# Patient Record
Sex: MALE | Race: WHITE | NOT HISPANIC OR LATINO | Employment: FULL TIME | URBAN - METROPOLITAN AREA
[De-identification: names, ages, dates, MRNs, and addresses within clinical notes are randomized per-mention and may not be internally consistent; named-entity substitution may affect disease eponyms.]

---

## 2017-02-23 ENCOUNTER — OFFICE VISIT (OUTPATIENT)
Dept: INTERNAL MEDICINE | Facility: CLINIC | Age: 32
End: 2017-02-23

## 2017-02-23 VITALS
BODY MASS INDEX: 39.62 KG/M2 | HEART RATE: 84 BPM | WEIGHT: 283 LBS | OXYGEN SATURATION: 98 % | HEIGHT: 71 IN | TEMPERATURE: 98.8 F | DIASTOLIC BLOOD PRESSURE: 84 MMHG | SYSTOLIC BLOOD PRESSURE: 138 MMHG

## 2017-02-23 DIAGNOSIS — J20.9 ACUTE BRONCHITIS, UNSPECIFIED ORGANISM: Primary | ICD-10-CM

## 2017-02-23 DIAGNOSIS — Z72.51 HIGH RISK SEXUAL BEHAVIOR: ICD-10-CM

## 2017-02-23 LAB
HCV AB SER DONR QL: NORMAL
HIV1+2 AB SER QL: NORMAL

## 2017-02-23 PROCEDURE — 86803 HEPATITIS C AB TEST: CPT | Performed by: NURSE PRACTITIONER

## 2017-02-23 PROCEDURE — 87491 CHLMYD TRACH DNA AMP PROBE: CPT | Performed by: NURSE PRACTITIONER

## 2017-02-23 PROCEDURE — 87591 N.GONORRHOEAE DNA AMP PROB: CPT | Performed by: NURSE PRACTITIONER

## 2017-02-23 PROCEDURE — 99213 OFFICE O/P EST LOW 20 MIN: CPT | Performed by: NURSE PRACTITIONER

## 2017-02-23 PROCEDURE — G0432 EIA HIV-1/HIV-2 SCREEN: HCPCS | Performed by: NURSE PRACTITIONER

## 2017-02-23 RX ORDER — IBUPROFEN 200 MG
200 TABLET ORAL EVERY 6 HOURS PRN
COMMUNITY

## 2017-02-23 RX ORDER — CEFDINIR 300 MG/1
300 CAPSULE ORAL 2 TIMES DAILY
Qty: 20 CAPSULE | Refills: 0 | Status: SHIPPED | OUTPATIENT
Start: 2017-02-23 | End: 2017-03-28

## 2017-02-23 NOTE — PROGRESS NOTES
"Subjective  Cough (ongoing for over 1 week, scratchy throat ); Fatigue; and Nasal Congestion      Enrique Larry is a 31 y.o. male.   Allergies   Allergen Reactions   • Azithromycin    • Penicillins    • Sulfa Antibiotics      History of Present Illness      Scratchy throat that has went away, thinks he may have had a fever, for past 3 days  Has been having prod cough , hoarse voice, \" I feel crummy\", has tried claritin and zicam with minimal relief , has had ill contacts   Non cigarette smoker   Wants to be checked for std's has had unprotected intercourse , no sx but would like to be checked    The following portions of the patient's history were reviewed and updated as appropriate: allergies, current medications, past family history, past medical history, past social history, past surgical history and problem list.    Review of Systems   Constitutional: Positive for fatigue and fever.   HENT: Positive for congestion, sneezing, sore throat and trouble swallowing.    Respiratory: Positive for cough.    Genitourinary:        Unprotected intercourse   Psychiatric/Behavioral: Negative.    All other systems reviewed and are negative.      Objective   Physical Exam   Constitutional: He is oriented to person, place, and time. He appears well-developed and well-nourished.   HENT:   Head: Normocephalic and atraumatic.   Right Ear: External ear normal.   Mouth/Throat: Oropharynx is clear and moist.   Neck: No thyromegaly present.   Cardiovascular: Normal rate and regular rhythm.    Pulmonary/Chest: He has rhonchi in the right lower field and the left lower field.   Genitourinary: Penis normal.   Lymphadenopathy:     He has no cervical adenopathy.   Neurological: He is alert and oriented to person, place, and time.   Skin: Skin is warm and dry.   Psychiatric: He has a normal mood and affect. His behavior is normal. Judgment and thought content normal.   Nursing note and vitals reviewed.    Visit Vitals   • /84   • " "Pulse 84   • Temp 98.8 °F (37.1 °C)   • Ht 71\" (180.3 cm)   • Wt 283 lb (128 kg)   • SpO2 98%   • BMI 39.47 kg/m2       Assessment/Plan     Problem List Items Addressed This Visit     None      Visit Diagnoses     High risk sexual behavior    -  Primary    Relevant Orders    C.trachomatis/N. gonorrhoeae PCR Urine    HIV-1/O/2 Ag/Ab w Reflex    Hepatitis C antibody    Acute bronchitis, unspecified organism        Relevant Medications    cefdinir (OMNICEF) 300 MG capsule          I have noted the pcn allergy ( bumps he got as a child), we have talked about the small percentage of cross reaction, he will call me with any needs   Std check will send to pt with poc when completed     "

## 2017-02-24 ENCOUNTER — TELEPHONE (OUTPATIENT)
Dept: INTERNAL MEDICINE | Facility: CLINIC | Age: 32
End: 2017-02-24

## 2017-02-24 NOTE — TELEPHONE ENCOUNTER
Called and informed patient that his lab results came back negative. Patient verbalized understanding and had no further questions.

## 2017-02-28 ENCOUNTER — TELEPHONE (OUTPATIENT)
Dept: INTERNAL MEDICINE | Facility: CLINIC | Age: 32
End: 2017-02-28

## 2017-02-28 LAB
C TRACH RRNA SPEC DONR QL NAA+PROBE: NEGATIVE
N GONORRHOEA DNA SPEC QL NAA+PROBE: NEGATIVE

## 2017-02-28 NOTE — TELEPHONE ENCOUNTER
Informed pt that his urine culture came back negative. Patient verbalized understanding and had no further questions.

## 2017-03-27 ENCOUNTER — TELEPHONE (OUTPATIENT)
Dept: INTERNAL MEDICINE | Facility: CLINIC | Age: 32
End: 2017-03-27

## 2017-03-27 NOTE — TELEPHONE ENCOUNTER
----- Message from Renata Quesada DO sent at 3/27/2017  2:25 PM EDT -----  Contact: PATIENT  Needs to be seen  ----- Message -----     From: Noni Parham MA     Sent: 3/27/2017   2:11 PM       To: Renata Quesada DO    Please advise.   ----- Message -----     From: Twila Denis     Sent: 3/27/2017   9:58 AM       To: Alyssa Moses MA    PATIENT WENT TO URGENT CARE ON SATURDAY FOR BRONCHITIS ISSUES. THEY GAVE HIM A STEROID INJECTION AND ANTIBIOTIC. HE HAS HAD 5 DOSES OF THE ANTIBIOTIC.  HE IS NOW RUNNING A FEVER AND STILL DOES NOT FEEL GOOD. HE WASN'T SURE IF WE NEED TO SEE HIM OR DOES HE NEED TO RIDE IT OUT SINCE HE JUST GOT STEROID INJECTION AND ANTIBIOTIC. YOU CAN REACH HIM BACK [phone]

## 2017-03-27 NOTE — TELEPHONE ENCOUNTER
Spoke with pt and informed him of Dr. Quesada recommendations to be seen. Pt verbalized understanding and was scheduled with Nelli on 3/28/17 at 8am. Pt had no further questions.

## 2017-03-28 ENCOUNTER — OFFICE VISIT (OUTPATIENT)
Dept: INTERNAL MEDICINE | Facility: CLINIC | Age: 32
End: 2017-03-28

## 2017-03-28 VITALS
HEART RATE: 92 BPM | BODY MASS INDEX: 38.64 KG/M2 | OXYGEN SATURATION: 98 % | DIASTOLIC BLOOD PRESSURE: 80 MMHG | HEIGHT: 71 IN | SYSTOLIC BLOOD PRESSURE: 130 MMHG | WEIGHT: 276 LBS | TEMPERATURE: 98.4 F

## 2017-03-28 DIAGNOSIS — J10.1 INFLUENZA A: Primary | ICD-10-CM

## 2017-03-28 LAB
EXPIRATION DATE: NORMAL
FLUAV AG NPH QL: POSITIVE
FLUBV AG NPH QL: NEGATIVE
INTERNAL CONTROL: NORMAL
Lab: NORMAL

## 2017-03-28 PROCEDURE — 99213 OFFICE O/P EST LOW 20 MIN: CPT | Performed by: PHYSICIAN ASSISTANT

## 2017-03-28 PROCEDURE — 87804 INFLUENZA ASSAY W/OPTIC: CPT | Performed by: PHYSICIAN ASSISTANT

## 2017-03-28 RX ORDER — METHYLPREDNISOLONE 4 MG/1
TABLET ORAL
Qty: 21 TABLET | Refills: 0 | Status: SHIPPED | OUTPATIENT
Start: 2017-03-28 | End: 2017-08-22

## 2017-03-28 RX ORDER — OSELTAMIVIR PHOSPHATE 75 MG/1
75 CAPSULE ORAL 2 TIMES DAILY
Qty: 10 CAPSULE | Refills: 0 | Status: SHIPPED | OUTPATIENT
Start: 2017-03-28 | End: 2017-08-22

## 2017-03-28 NOTE — PROGRESS NOTES
Chief Complaint   Patient presents with   • Fever, cough/mucus, nasal congestion     Cough x1 month, fever, chills x4 days       Subjective   Enrique Larry is a 31 y.o. male.       History of Present Illness     Pt had bronchitis a month ago. Was treated with abx, never completely cleared the cough. Symptoms worsened over the past weekend. Went to urgent care the next day, had fever that day. Got much worse with fever and cough 2 days ago. No flu swab at urgent care. Unsure about flu exposure, did have flu shot. Was given doxycycline and had 4 days ago. He is wheezing and feels tight in chest. Some increased nasal congestion and headache with most recent illness.      Current Outpatient Prescriptions:   •  doxycycline (MONODOX) 100 MG capsule, Take 1 capsule by mouth 2 (Two) Times a Day., Disp: 20 capsule, Rfl: 0  •  ibuprofen (ADVIL,MOTRIN) 200 MG tablet, Take 200 mg by mouth Every 6 (Six) Hours As Needed for mild pain (1-3)., Disp: , Rfl:   •  MethylPREDNISolone (MEDROL) 4 MG tablet, follow package directions, Disp: 21 tablet, Rfl: 0  •  oseltamivir (TAMIFLU) 75 MG capsule, Take 1 capsule by mouth 2 (Two) Times a Day., Disp: 10 capsule, Rfl: 0     [facility]  The following portions of the patient's history were reviewed and updated as appropriate: allergies, current medications, past family history, past medical history, past social history, past surgical history and problem list.    Review of Systems   Constitutional: Positive for activity change, appetite change, diaphoresis, fatigue and fever.   HENT: Positive for congestion, postnasal drip and rhinorrhea.    Respiratory: Positive for cough, chest tightness and wheezing. Negative for shortness of breath.    Cardiovascular: Negative for chest pain and palpitations.   Gastrointestinal: Negative for abdominal distention, diarrhea and nausea.   Genitourinary: Negative.    Musculoskeletal: Positive for myalgias. Negative for arthralgias.   Neurological: Negative for  "dizziness, weakness and light-headedness.   Hematological: Negative for adenopathy.   Psychiatric/Behavioral: Negative.        Objective   /80  Pulse 92  Temp 98.4 °F (36.9 °C)  Ht 71\" (180.3 cm)  Wt 276 lb (125 kg)  SpO2 98%  BMI 38.49 kg/m2    Physical Exam   Constitutional: He is oriented to person, place, and time. He appears well-developed and well-nourished.  Non-toxic appearance. No distress.   HENT:   Head: Normocephalic and atraumatic. Hair is normal.   Right Ear: External ear normal. No drainage, swelling or tenderness. Tympanic membrane is retracted.   Left Ear: External ear normal. No drainage, swelling or tenderness. Tympanic membrane is retracted.   Nose: Mucosal edema present. No epistaxis.   Mouth/Throat: Uvula is midline and mucous membranes are normal. No oral lesions. No uvula swelling. Posterior oropharyngeal erythema present. No oropharyngeal exudate.   Eyes: Conjunctivae and EOM are normal. Pupils are equal, round, and reactive to light. Right eye exhibits no discharge. Left eye exhibits no discharge. No scleral icterus.   Neck: Normal range of motion. Neck supple.   Cardiovascular: Normal rate, regular rhythm and normal heart sounds.  Exam reveals no gallop.    No murmur heard.  Pulmonary/Chest: Breath sounds normal. No stridor. No respiratory distress. He has no wheezes. He has no rales. He exhibits no tenderness.   Abdominal: Soft. There is no tenderness.   Lymphadenopathy:     He has cervical adenopathy.   Neurological: He is alert and oriented to person, place, and time. He exhibits normal muscle tone.   Skin: Skin is warm and dry. No rash noted. He is not diaphoretic.   Psychiatric: He has a normal mood and affect. His behavior is normal. Judgment and thought content normal.   Nursing note and vitals reviewed.      Results for orders placed or performed in visit on 03/28/17   POCT Influenza A/B   Result Value Ref Range    Rapid Influenza A Ag positive     Rapid Influenza B Ag " negative     Internal Control Passed Passed    Lot Number [id number]     Expiration Date 12/06/2019         ASSESSMENT/PLAN    Problem List Items Addressed This Visit     None      Visit Diagnoses     Influenza A    -  Primary    Take tamiflu and medrol dose pack as directed. Continue rest and increased fluids. RTC if not improving. Work note provided.    Relevant Medications    oseltamivir (TAMIFLU) 75 MG capsule    MethylPREDNISolone (MEDROL) 4 MG tablet    Other Relevant Orders    POCT Influenza A/B (Completed)               Return if symptoms worsen or fail to improve.

## 2017-08-22 ENCOUNTER — OFFICE VISIT (OUTPATIENT)
Dept: INTERNAL MEDICINE | Facility: CLINIC | Age: 32
End: 2017-08-22

## 2017-08-22 VITALS — BODY MASS INDEX: 39.47 KG/M2 | WEIGHT: 283 LBS

## 2017-08-22 DIAGNOSIS — L60.0 INGROWN NAIL OF GREAT TOE OF RIGHT FOOT: Primary | ICD-10-CM

## 2017-08-22 PROCEDURE — 99213 OFFICE O/P EST LOW 20 MIN: CPT | Performed by: NURSE PRACTITIONER

## 2017-08-22 NOTE — PROGRESS NOTES
Chief Complaint   Patient presents with   • Ingrown Toenail       HPI  Enrique Larry is a 32 y.o. male who presents with ingrowing right great toenail, tried cutting straight across, had a little drainage a couple days ago, painful laterally. Wears steal toe shoe for work, on feet all day.    [facility]  The following portions of the patient's history were reviewed and updated as appropriate: allergies, current medications, past family history, past medical history, past social history, past surgical history and problem list.    Past Medical History:   Diagnosis Date   • Cancer      Past Surgical History:   Procedure Laterality Date   • ABOVE KNEE LEG AMPUTATION       There are no active problems to display for this patient.    Social History   Substance Use Topics   • Smoking status: Never Smoker   • Smokeless tobacco: Current User     Types: Snuff   • Alcohol use Yes         Review of Systems   Constitutional: Negative for fever.   Skin:        Ingrowing toenail right great toe.           Objective   Weight 283 lb (128 kg).  Body mass index is 39.47 kg/(m^2).      Physical Exam   Constitutional: He appears well-developed and well-nourished. He does not appear ill.   Skin:   Right great toenail ingrowing laterally, surrounding tenderness and slight erythema, no drainage.   Psychiatric: He has a normal mood and affect.             ASSESSMENT/PLAN  Assessment/Plan         1. Ingrown nail of great toe of right foot  Soak 2-3 times daily in warm water with epsom salts, followed by gentle traction with dental floss. Then apply:  - mupirocin (BACTROBAN) 2 % ointment; Apply  topically 3 (Three) Times a Day.  Dispense: 22 g; Refill: 0  If fever, purulent drainage, semaj or RTC.    Referred to podiatry.            FOLLOW-UP      Plan of care reviewed with patient at the conclusion of today's visit. Education was provided in regards to diagnosis, symptom management and any prescribed or recommended OTC medications.  Patient  verbalizes Understanding of and agreement with management plan.    Electronically signed by:  CAMILLE Hunt  08/22/2017

## 2018-09-17 ENCOUNTER — OFFICE VISIT (OUTPATIENT)
Dept: INTERNAL MEDICINE | Facility: CLINIC | Age: 33
End: 2018-09-17

## 2018-09-17 VITALS
WEIGHT: 282 LBS | OXYGEN SATURATION: 96 % | HEART RATE: 108 BPM | HEIGHT: 71 IN | TEMPERATURE: 99.2 F | BODY MASS INDEX: 39.48 KG/M2

## 2018-09-17 DIAGNOSIS — R05.9 COUGH: ICD-10-CM

## 2018-09-17 DIAGNOSIS — R68.89 FLU-LIKE SYMPTOMS: ICD-10-CM

## 2018-09-17 DIAGNOSIS — J01.00 ACUTE NON-RECURRENT MAXILLARY SINUSITIS: Primary | ICD-10-CM

## 2018-09-17 LAB
EXPIRATION DATE: NORMAL
FLUAV AG NPH QL: NORMAL
FLUBV AG NPH QL: NORMAL
INTERNAL CONTROL: NORMAL
Lab: NORMAL

## 2018-09-17 PROCEDURE — 99213 OFFICE O/P EST LOW 20 MIN: CPT | Performed by: NURSE PRACTITIONER

## 2018-09-17 PROCEDURE — 87804 INFLUENZA ASSAY W/OPTIC: CPT | Performed by: NURSE PRACTITIONER

## 2018-09-17 RX ORDER — BENZONATATE 100 MG/1
100 CAPSULE ORAL 3 TIMES DAILY PRN
Qty: 21 CAPSULE | Refills: 0 | Status: SHIPPED | OUTPATIENT
Start: 2018-09-17 | End: 2019-02-16 | Stop reason: SDUPTHER

## 2018-09-17 RX ORDER — DOXYCYCLINE HYCLATE 100 MG/1
100 CAPSULE ORAL 2 TIMES DAILY
Qty: 14 CAPSULE | Refills: 0 | Status: SHIPPED | OUTPATIENT
Start: 2018-09-17 | End: 2018-09-24

## 2018-09-17 NOTE — PROGRESS NOTES
Chief Complaint   Patient presents with   • Nasal Congestion   • Dizziness   • Headache       History of Present Illness  33 y.o.male presents for URI sx, dizziness, headaches.    Onset sx Friday nasal congestion, rhinorrhea, sneezing, coughing.  Headaches with dizziness.  Some occasional muscle aches pains. Tried sudafed, Nyquil, zycam.        Review of Systems   Constitutional: Positive for fever. Negative for chills.   HENT: Positive for congestion, postnasal drip, rhinorrhea, sinus pressure, sneezing and sore throat. Negative for ear pain.    Respiratory: Positive for cough. Negative for shortness of breath.    Cardiovascular: Negative for chest pain.   Gastrointestinal: Negative for diarrhea, nausea and vomiting.   Musculoskeletal: Positive for myalgias.   Neurological: Positive for dizziness and headache.         [facility]  The following portions of the patient's history were reviewed and updated as appropriate: allergies, current medications, past family history, past medical history, past social history, past surgical history and problem list.     Past Medical History:   Diagnosis Date   • Cancer (CMS/HCC)       Past Surgical History:   Procedure Laterality Date   • ABOVE KNEE LEG AMPUTATION        Allergies   Allergen Reactions   • Azithromycin    • Penicillins    • Sulfa Antibiotics       Family History   Problem Relation Age of Onset   • Cancer Mother    • Hypertension Mother    • Thyroid disease Mother    • Cancer Maternal Aunt    • Cancer Maternal Uncle    • Cancer Paternal Aunt    • Cancer Paternal Uncle    • Cancer Maternal Grandmother       Social History     Social History   • Marital status: Single     Spouse name: N/A   • Number of children: N/A   • Years of education: N/A     Occupational History   • Not on file.     Social History Main Topics   • Smoking status: Never Smoker   • Smokeless tobacco: Current User     Types: Snuff   • Alcohol use Yes   • Drug use: No   • Sexual activity: Defer     Other  "Topics Concern   • Not on file     Social History Narrative   • No narrative on file         Current Outpatient Prescriptions:   •  ibuprofen (ADVIL,MOTRIN) 200 MG tablet, Take 200 mg by mouth Every 6 (Six) Hours As Needed for mild pain (1-3)., Disp: , Rfl:   •  mupirocin (BACTROBAN) 2 % ointment, Apply  topically 3 (Three) Times a Day., Disp: 22 g, Rfl: 0    VITALS:  Pulse 108   Temp 99.2 °F (37.3 °C)   Ht 180.3 cm (71\")   Wt 128 kg (282 lb)   SpO2 96%   BMI 39.33 kg/m²     Physical Exam   Constitutional: He is oriented to person, place, and time. He appears well-developed and well-nourished. No distress.   HENT:   Head: Normocephalic and atraumatic.   Right Ear: Tympanic membrane, external ear and ear canal normal.   Left Ear: Tympanic membrane, external ear and ear canal normal.   Nose: Mucosal edema, rhinorrhea, sinus tenderness and congestion present. Right sinus exhibits maxillary sinus tenderness. Right sinus exhibits no frontal sinus tenderness. Left sinus exhibits maxillary sinus tenderness. Left sinus exhibits no frontal sinus tenderness.   Mouth/Throat: Mucous membranes are normal. Posterior oropharyngeal erythema present. No oropharyngeal exudate, posterior oropharyngeal edema or tonsillar abscesses.   karen ear cerumen   Eyes: Pupils are equal, round, and reactive to light. Conjunctivae are normal. Right eye exhibits no discharge. Left eye exhibits no discharge.   Neck: Neck supple.   Cardiovascular: Normal rate, regular rhythm and normal heart sounds.    Pulmonary/Chest: Effort normal and breath sounds normal. No respiratory distress. He has no wheezes.   Abdominal: Soft. Bowel sounds are normal.   Lymphadenopathy:        Head (right side): No submental, no submandibular, no tonsillar and no preauricular adenopathy present.        Head (left side): No submental, no submandibular, no tonsillar and no preauricular adenopathy present.     He has no cervical adenopathy.   Neurological: He is alert and " oriented to person, place, and time.   Skin: Skin is warm and dry. Capillary refill takes less than 2 seconds. He is not diaphoretic.   Nursing note and vitals reviewed.      LABS  Results for orders placed or performed in visit on 09/17/18   POCT Influenza A/B   Result Value Ref Range    Rapid Influenza A Ag neg     Rapid Influenza B Ag neg     Internal Control Passed Passed    Lot Number [id number]     Expiration Date 1/28/20        ASSESSMENT/PLAN  Enrique was seen today for nasal congestion, dizziness and headache.    Diagnoses and all orders for this visit:    Acute non-recurrent maxillary sinusitis  Comments:  add daily antihistamine; drink plenty of water; can cont sudafed  Orders:  -     doxycycline (VIBRAMYCIN) 100 MG capsule; Take 1 capsule by mouth 2 (Two) Times a Day for 7 days.    Cough  -     benzonatate (TESSALON PERLES) 100 MG capsule; Take 1 capsule by mouth 3 (Three) Times a Day As Needed for Cough.    Flu-like symptoms  -     POCT Influenza A/B    If worsening symptoms or fever > 101.5 recommend he contact us for further eval.    I discussed the patients findings and my recommendations with patient.     Patient was encouraged to keep me informed of any acute changes, lack of improvement, or any new concerning symptoms.    Patient voiced understanding of all instructions and denied further questions.      FOLLOW-UP  Return if symptoms worsen or fail to improve.    Electronically signed by:    CAMILLE Sykes  09/17/2018

## 2019-02-16 ENCOUNTER — OFFICE VISIT (OUTPATIENT)
Dept: INTERNAL MEDICINE | Facility: CLINIC | Age: 34
End: 2019-02-16

## 2019-02-16 VITALS
WEIGHT: 294 LBS | HEART RATE: 97 BPM | OXYGEN SATURATION: 97 % | DIASTOLIC BLOOD PRESSURE: 76 MMHG | HEIGHT: 71 IN | SYSTOLIC BLOOD PRESSURE: 114 MMHG | TEMPERATURE: 98.4 F | BODY MASS INDEX: 41.16 KG/M2

## 2019-02-16 DIAGNOSIS — J06.9 VIRAL URI WITH COUGH: Primary | ICD-10-CM

## 2019-02-16 DIAGNOSIS — R05.9 COUGH: ICD-10-CM

## 2019-02-16 LAB
EXPIRATION DATE: NORMAL
FLUAV AG NPH QL: NEGATIVE
FLUBV AG NPH QL: NEGATIVE
INTERNAL CONTROL: NORMAL
Lab: NORMAL

## 2019-02-16 PROCEDURE — 87804 INFLUENZA ASSAY W/OPTIC: CPT | Performed by: NURSE PRACTITIONER

## 2019-02-16 PROCEDURE — 99214 OFFICE O/P EST MOD 30 MIN: CPT | Performed by: NURSE PRACTITIONER

## 2019-02-16 RX ORDER — BENZONATATE 100 MG/1
100 CAPSULE ORAL 3 TIMES DAILY PRN
Qty: 21 CAPSULE | Refills: 0 | Status: SHIPPED | OUTPATIENT
Start: 2019-02-16 | End: 2019-08-12

## 2019-02-16 NOTE — PROGRESS NOTES
Maty Larry is a 33 y.o. male.     URI    This is a new problem. The current episode started yesterday. The problem has been unchanged. There has been no fever. Associated symptoms include congestion, coughing, rhinorrhea and sneezing. Pertinent negatives include no chest pain, ear pain, headaches, nausea, neck pain, rash, sinus pain, sore throat or swollen glands. He has tried antihistamine and increased fluids for the symptoms. The treatment provided mild relief.           Review of Systems   Constitutional: Negative for chills and fever.   HENT: Positive for congestion, rhinorrhea and sneezing. Negative for ear pain, sinus pressure, sinus pain and sore throat.    Respiratory: Positive for cough. Negative for chest tightness.    Cardiovascular: Negative for chest pain.   Gastrointestinal: Negative for nausea.   Musculoskeletal: Negative for neck pain.   Skin: Negative for rash.   Allergic/Immunologic: Negative for environmental allergies and immunocompromised state.   Neurological: Negative for headaches.   Hematological: Negative for adenopathy.       Objective   Physical Exam   Constitutional: He is oriented to person, place, and time. He appears well-developed and well-nourished. No distress.   HENT:   Head: Normocephalic and atraumatic.   Right Ear: Tympanic membrane normal.   Left Ear: Tympanic membrane normal.   Nose: Mucosal edema and rhinorrhea present. Right sinus exhibits no maxillary sinus tenderness and no frontal sinus tenderness. Left sinus exhibits no maxillary sinus tenderness and no frontal sinus tenderness.   Mouth/Throat: No oropharyngeal exudate.   Eyes: EOM are normal. Pupils are equal, round, and reactive to light.   Neck: Normal range of motion. Neck supple.   Cardiovascular: Normal rate, regular rhythm and normal heart sounds. Exam reveals no gallop and no friction rub.   No murmur heard.  Pulmonary/Chest: Effort normal and breath sounds normal.   cough   Musculoskeletal:  Normal range of motion.   Lymphadenopathy:     He has no cervical adenopathy.   Neurological: He is alert and oriented to person, place, and time.   Skin: Skin is warm and dry. Capillary refill takes less than 2 seconds. No rash noted. He is not diaphoretic.   Psychiatric: He has a normal mood and affect. His behavior is normal. Judgment and thought content normal.   Nursing note and vitals reviewed.      Assessment/Plan   Enrique was seen today for cough, nasal congestion, sore throat, wheezing and chills.    Diagnoses and all orders for this visit:    Viral URI with cough  -     benzonatate (TESSALON PERLES) 100 MG capsule; Take 1 capsule by mouth 3 (Three) Times a Day As Needed for Cough.  -     Chlorcyclizine-Pseudoephed (STAHIST AD) 25-60 MG tablet; Take 1 tablet by mouth Every 8 (Eight) Hours As Needed (congestion) for up to 7 days.    Cough  -     POCT Influenza A/B  -     benzonatate (TESSALON PERLES) 100 MG capsule; Take 1 capsule by mouth 3 (Three) Times a Day As Needed for Cough.       Instructed patient to also use Flonase that he has at home. He verbalized understanding.

## 2019-08-12 ENCOUNTER — OFFICE VISIT (OUTPATIENT)
Dept: INTERNAL MEDICINE | Facility: CLINIC | Age: 34
End: 2019-08-12

## 2019-08-12 VITALS
DIASTOLIC BLOOD PRESSURE: 80 MMHG | HEART RATE: 74 BPM | BODY MASS INDEX: 38.52 KG/M2 | WEIGHT: 276.2 LBS | SYSTOLIC BLOOD PRESSURE: 110 MMHG | OXYGEN SATURATION: 98 %

## 2019-08-12 DIAGNOSIS — R21 RASH: Primary | ICD-10-CM

## 2019-08-12 DIAGNOSIS — L60.0 INGROWN NAIL OF GREAT TOE OF RIGHT FOOT: ICD-10-CM

## 2019-08-12 PROCEDURE — 99214 OFFICE O/P EST MOD 30 MIN: CPT | Performed by: INTERNAL MEDICINE

## 2019-08-12 RX ORDER — DOXYCYCLINE 100 MG/1
100 TABLET ORAL 2 TIMES DAILY
Qty: 20 TABLET | Refills: 0 | Status: SHIPPED | OUTPATIENT
Start: 2019-08-12 | End: 2020-09-30

## 2019-08-12 NOTE — TELEPHONE ENCOUNTER
PATIENT WAS HERE TODAY, AND FORGOT TO REQUEST A REFILL FOR MUPIROCIN 2% OINTMENT. HIS PHARMACY IS [facility]. PT CAN BE REACHED  [phone].

## 2019-08-12 NOTE — PROGRESS NOTES
Subjective   Enrique Larry is a 34 y.o. male.   Chief Complaint   Patient presents with   • Blisters on Arms     Acute       History of Present Illness   Three  Sores on arm  That started Tuesday last week and just got back Sunday from [place]. They do not itch. No fever. Normal BP.  Using bactroban oinment on them helps some. Nothing makes them worst.  The following portions of the patient's history were reviewed and updated as appropriate: allergies, current medications, past family history, past medical history, past social history, past surgical history and problem list.    Review of Systems   Constitutional: Negative for activity change, appetite change, chills, diaphoresis, fatigue, fever and unexpected weight change.   HENT: Negative for congestion, dental problem, drooling, ear discharge, ear pain, facial swelling, hearing loss, mouth sores, nosebleeds, postnasal drip, rhinorrhea, sinus pressure, sneezing, sore throat, tinnitus, trouble swallowing and voice change.    Eyes: Negative for photophobia, pain, discharge, itching and visual disturbance.   Respiratory: Negative for cough, choking, chest tightness, shortness of breath, wheezing and stridor.    Cardiovascular: Negative for palpitations and leg swelling.   Gastrointestinal: Negative for abdominal distention, abdominal pain, anal bleeding, blood in stool, constipation, diarrhea, nausea and vomiting.   Endocrine: Negative for cold intolerance, heat intolerance, polydipsia and polyphagia.   Genitourinary: Negative for decreased urine volume, discharge, dysuria, enuresis, flank pain, frequency, genital sores, hematuria, scrotal swelling, testicular pain and urgency.   Musculoskeletal: Negative for arthralgias, back pain, gait problem, joint swelling, myalgias, neck pain and neck stiffness.   Skin: Positive for rash. Negative for color change, pallor and wound.   Allergic/Immunologic: Negative for environmental allergies, food allergies and  immunocompromised state.   Neurological: Negative for dizziness, tremors, seizures, syncope, facial asymmetry, speech difficulty, weakness, light-headedness, numbness and headaches.   Hematological: Negative for adenopathy. Does not bruise/bleed easily.   Psychiatric/Behavioral: Negative for agitation, behavioral problems, confusion, dysphoric mood, hallucinations, sleep disturbance and suicidal ideas. The patient is not nervous/anxious and is not hyperactive.      /80   Pulse 74   Wt 125 kg (276 lb 3.2 oz)   SpO2 98%   BMI 38.52 kg/m²     Objective   Physical Exam   Constitutional: He appears well-developed.   HENT:   Head: Normocephalic.   Right Ear: External ear normal.   Left Ear: External ear normal.   Nose: Nose normal.   Mouth/Throat: Oropharynx is clear and moist.   Eyes: Conjunctivae are normal. Pupils are equal, round, and reactive to light.   Neck: No JVD present. No thyromegaly present.   Cardiovascular: Normal rate, regular rhythm and normal heart sounds. Exam reveals no friction rub.   No murmur heard.  Pulmonary/Chest: Effort normal and breath sounds normal. No respiratory distress. He has no wheezes. He has no rales.   Abdominal: Soft. Bowel sounds are normal. He exhibits no distension. There is no tenderness. There is no guarding.   Musculoskeletal: He exhibits no edema or tenderness.   Lymphadenopathy:     He has no cervical adenopathy.   Neurological: He displays normal reflexes. No cranial nerve deficit.   Skin: Rash noted.        Psychiatric: His behavior is normal.   Nursing note and vitals reviewed.      Assessment/Plan   Enrique was seen today for blisters on arms.    Diagnoses and all orders for this visit:    Rash  -     doxycycline (ADOXA) 100 MG tablet; Take 1 tablet by mouth 2 (Two) Times a Day.    if fever (0101 or more) or expanding red area to ER.

## 2020-09-30 ENCOUNTER — OFFICE VISIT (OUTPATIENT)
Dept: INTERNAL MEDICINE | Facility: CLINIC | Age: 35
End: 2020-09-30

## 2020-09-30 VITALS
BODY MASS INDEX: 38.75 KG/M2 | WEIGHT: 277.8 LBS | OXYGEN SATURATION: 98 % | SYSTOLIC BLOOD PRESSURE: 130 MMHG | TEMPERATURE: 98.1 F | HEART RATE: 74 BPM | DIASTOLIC BLOOD PRESSURE: 80 MMHG

## 2020-09-30 DIAGNOSIS — Z00.00 ANNUAL PHYSICAL EXAM: Primary | ICD-10-CM

## 2020-09-30 DIAGNOSIS — H61.21 RIGHT EAR IMPACTED CERUMEN: ICD-10-CM

## 2020-09-30 DIAGNOSIS — Z89.612 HX OF AKA (ABOVE KNEE AMPUTATION), LEFT (HCC): ICD-10-CM

## 2020-09-30 PROCEDURE — 99395 PREV VISIT EST AGE 18-39: CPT | Performed by: NURSE PRACTITIONER

## 2020-09-30 PROCEDURE — 69209 REMOVE IMPACTED EAR WAX UNI: CPT | Performed by: NURSE PRACTITIONER

## 2020-09-30 NOTE — PROGRESS NOTES
Chief Complaint   Patient presents with   • Annual Exam       History of Present Illness    Enrique Larry is a 35 y.o. male who presents today for an annual physical exam.     MSK  History of osteosarcoma with initial diagnosis made in 2001 and resulting in loss of the left lower extremity in 2008.  Patient is a long-term prosthetic user and has had a prosthesis since July 2008.  His current prosthesis was provided in 2019 and is now ill fitting due to damage to interface materials and carbon fiber struts from normal wear and tear, he is needing a new prosthetic socket at this time. His current prosthetic is causing frequent skin breakdown resulting in boils and blisters. He is being followed by [facility] and has had multiple repairs of his prosthesis on several occasions over the past year, at this time it is no longer able to be repaired further per his orthopedic provider.  His current prosthesis has provided increased independence and mobility due to its components and adjustability.  He has been previously fit with other sockets that do not incorporate current technology and has had problems with impaired skin integrity, his prosthetic falling off, falls, and missed work due to increased prosthetic appointments.  He is highly motivated in continuing to use his prosthesis to increase his quality of life.  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      Annual Exam  The patient is being seen for a health maintenance evaluation.  The last health maintenance was unrecalled year(s) ago.    Social History  Enrique  does not smoke cigarettes. Uses smokeless tobacco.   He drinks occasional alcohol. Weekly to monthly, typically beer or bourbon.  He does not use illicit drugs.    General History  Enrique  does not have regular dental visits. To get scheduled.   He does complain of vision problems, wears contacts. Last eye exam  "was within the last year.  Immunizations are up to date. The patient needs the following immunizations: none    Lifestyle  Enrique  consumes a in general, an \"unhealthy\" diet, on average, 2-3 fast food meals per week. Bfast: toast and pb&jelly and milk,  Lunch: gatorade, cheese its, and trail mix, Dinner: and weekends larger portion sizes  He exercises weekly. Golfs 2-3 days/week. 9-27 holes. Generally kayaks more but issues with prosthesis has limited ability to do so this year.     Reproductive Health  Enrique  is sexually active. His contraceptive plan is partner using contraception.   He does not have erectile dysfunction.     Screening  Last PSA was N/A.  Last prostate exam was  N/A. Family history of prostate cancer: none  Last testicular exam was self-performed. Concerned for lump that is noticed with palpation, no tender denies scrotal or testicular swelling.   Last colonoscopy was N/A. Family history of colon cancer: mother with colon cancer and paternal uncle.   Other pertinent family history and/or screenings: personal history of osteosarcoma, maternal grandmother and uncle both with glioblastoma, maternal aunt with breast cancer.       Review of Systems  Review of Systems   Constitutional: Negative for appetite change, chills, diaphoresis, fatigue and fever.   HENT: Negative for congestion, ear pain, postnasal drip, rhinorrhea, sinus pressure, sinus pain and sore throat.    Eyes: Negative for pain, discharge, redness, itching and visual disturbance.   Respiratory: Positive for wheezing. Negative for cough, chest tightness and shortness of breath.    Cardiovascular: Negative for chest pain, palpitations and leg swelling.   Gastrointestinal: Negative for abdominal pain, blood in stool, constipation, diarrhea, nausea and vomiting.   Endocrine: Negative for cold intolerance, heat intolerance, polydipsia, polyphagia and polyuria.   Genitourinary: Negative for decreased urine volume, dysuria, flank pain, " frequency, hematuria, penile swelling, scrotal swelling, testicular pain and urgency.   Musculoskeletal: Negative for arthralgias, back pain and myalgias.   Skin: Negative for color change, rash and wound.   Allergic/Immunologic: Negative for environmental allergies and food allergies.   Neurological: Negative for dizziness, syncope, weakness, light-headedness, numbness and headaches.   Hematological: Does not bruise/bleed easily.   Psychiatric/Behavioral: Negative for confusion, dysphoric mood and sleep disturbance. The patient is not nervous/anxious.        [facility]    The following portions of the patient's history were reviewed and updated as appropriate: allergies, current medications, past family history, past medical history, past social history, past surgical history and problem list.     Social History     Tobacco Use   • Smoking status: Never Smoker   • Smokeless tobacco: Current User     Types: Snuff   Substance Use Topics   • Alcohol use: Yes       Past Medical History:   Diagnosis Date   • Cancer (CMS/HCC)        Past Surgical History:   Procedure Laterality Date   • ABOVE KNEE LEG AMPUTATION         Family History   Problem Relation Age of Onset   • Cancer Mother    • Hypertension Mother    • Thyroid disease Mother    • Cancer Maternal Aunt    • Cancer Maternal Uncle    • Cancer Paternal Aunt    • Cancer Paternal Uncle    • Cancer Maternal Grandmother        Allergies   Allergen Reactions   • Azithromycin Nausea And Vomiting   • Penicillins Rash   • Sulfa Antibiotics Rash         Current Outpatient Medications:   •  ibuprofen (ADVIL,MOTRIN) 200 MG tablet, Take 200 mg by mouth Every 6 (Six) Hours As Needed for mild pain (1-3)., Disp: , Rfl:   •  mupirocin (BACTROBAN) 2 % ointment, Apply  topically to the appropriate area as directed 3 (Three) Times a Day., Disp: 22 g, Rfl: 1    Vitals:  Vitals:    09/30/20 1648 09/30/20 1742   BP: 152/90 130/80   Pulse: 74    Temp: 98.1 °F (36.7 °C)    SpO2: 98%    Weight:  126 kg (277 lb 12.8 oz)    PainSc: 0-No pain        Physical Exam  Physical Exam  Vitals signs and nursing note reviewed. Exam conducted with a chaperone present.   Constitutional:       General: He is not in acute distress.     Appearance: Normal appearance. He is well-developed. He is not ill-appearing or toxic-appearing.   HENT:      Head: Normocephalic and atraumatic.      Right Ear: External ear normal. There is impacted cerumen.      Left Ear: Tympanic membrane, ear canal and external ear normal.      Nose: Nose normal. No nasal tenderness, mucosal edema or rhinorrhea.      Right Sinus: No maxillary sinus tenderness or frontal sinus tenderness.      Left Sinus: No maxillary sinus tenderness or frontal sinus tenderness.      Mouth/Throat:      Lips: Pink.      Mouth: Mucous membranes are moist.      Pharynx: Oropharynx is clear.   Eyes:      General: Lids are normal.      Extraocular Movements: Extraocular movements intact.      Conjunctiva/sclera: Conjunctivae normal.      Pupils: Pupils are equal, round, and reactive to light.   Neck:      Musculoskeletal: Normal range of motion and neck supple.      Thyroid: No thyroid mass, thyromegaly or thyroid tenderness.      Vascular: No carotid bruit.      Trachea: Trachea and phonation normal. No tracheal deviation.   Cardiovascular:      Rate and Rhythm: Normal rate and regular rhythm.      Pulses:           Carotid pulses are 2+ on the right side.       Radial pulses are 2+ on the right side.        Dorsalis pedis pulses are 2+ on the right side.      Heart sounds: Normal heart sounds. No murmur.   Pulmonary:      Effort: Pulmonary effort is normal. No respiratory distress.      Breath sounds: Normal breath sounds. No decreased breath sounds, wheezing, rhonchi or rales.   Chest:      Chest wall: No tenderness.   Abdominal:      General: Abdomen is protuberant. Bowel sounds are normal. There is no distension.      Palpations: Abdomen is soft. There is no mass.       Tenderness: There is no abdominal tenderness. There is no guarding or rebound.      Hernia: No hernia is present. There is no hernia in the left inguinal area or right inguinal area.   Genitourinary:     Pubic Area: No rash.       Penis: Normal and circumcised.       Scrotum/Testes: Normal.         Right: Mass, tenderness or swelling not present.         Left: Mass, tenderness or swelling not present.      Epididymis:      Right: Normal.      Left: Normal.   Musculoskeletal: Normal range of motion.         General: No tenderness or deformity.      Right lower leg: No edema.   Feet:      Left foot:      Amputation: Left leg is amputated above knee.   Lymphadenopathy:      Head:      Right side of head: No submental, submandibular, tonsillar, preauricular or posterior auricular adenopathy.      Left side of head: No submental, submandibular, tonsillar, preauricular or posterior auricular adenopathy.      Cervical: No cervical adenopathy.      Upper Body:      Right upper body: No supraclavicular adenopathy.      Left upper body: No supraclavicular adenopathy.      Lower Body: No right inguinal adenopathy. No left inguinal adenopathy.   Skin:     General: Skin is warm and dry.      Capillary Refill: Capillary refill takes less than 2 seconds.      Findings: No abrasion, abscess, erythema, rash or wound.   Neurological:      Mental Status: He is alert and oriented to person, place, and time.      Cranial Nerves: No cranial nerve deficit.      Sensory: Sensation is intact.      Motor: Motor function is intact.      Coordination: Coordination is intact.      Gait: Gait is intact.      Comments: Left leg prosthetic   Psychiatric:         Attention and Perception: Attention and perception normal.         Mood and Affect: Mood and affect normal.         Speech: Speech normal.         Behavior: Behavior normal. Behavior is cooperative.         Thought Content: Thought content normal.         Cognition and Memory: Cognition  and memory normal.         Judgment: Judgment normal.         Labs  Per visit orders    Ear Cerumen Removal    Date/Time: 9/30/2020 7:01 PM  Performed by: Jaylene Quintero APRN  Authorized by: Jaylene Quintero APRN   Consent: Verbal consent obtained.  Risks and benefits: risks, benefits and alternatives were discussed  Consent given by: patient  Patient understanding: patient states understanding of the procedure being performed  Patient consent: the patient's understanding of the procedure matches consent given  Procedure consent: procedure consent matches procedure scheduled  Required items: required blood products, implants, devices, and special equipment available  Patient identity confirmed: verbally with patient  Location details: right ear  Patient tolerance: patient tolerated the procedure well with no immediate complications  Procedure type: irrigation       Assessment/Plan    nErique was seen today for annual exam.    Diagnoses and all orders for this visit:    Annual physical exam  -     Comprehensive Metabolic Panel; Future  -     CBC Auto Differential; Future  -     Lipid Panel; Future  -     TSH; Future  -     T4, free; Future  -     Hemoglobin A1c; Future  The patient is here for an annual health maintenance visit.  Currently, the patient consumes an unhealthy diet and has an adequate exercise regimen. Screening lab work is ordered.  Immunizations discussed for health maintenance this visit were declined.  Advice and education is given regarding nutrition, aerobic exercise, routine dental evaluations, routine eye exams, reproductive health, cardiovascular risk reduction, sunscreen use, self skin examination (annual dermatology evaluations) and seat belt use (general overall safety).  Further recommendations after lab evaluation.  Annual wellness evaluations recommended.     Right ear impacted cerumen  -     Ear Cerumen Removal    Hx of AKA (above knee amputation), left (CMS/HCC)  It is my opinion that it  is medically necessary for Mr. Larry to have an adjustable socket prosthesis to aid in quality of life including continued ADLs and vocational responsibilities.  Traditional systems have historically caused intolerable consequences and greatly limit his activities. Notes from today's office visit and physical exam to be faxed to orthopedic provider for further management of prosthesis.       Plan of care reviewed with patient at conclusion of today's visit. Patient education was provided regarding diagnosis, management, and prescribed or recommended OTC medications. Patient was informed to notify office of any new, worsening, or persistent symptoms. Patient verbalized understanding and agreement with plan of care.     Follow-Up  Return in about 1 year (around 9/30/2021) for Annual.      Electronically Signed By:  CAMILLE Sr/Transcription Disclaimer:  Please note that portions of this encounter note were completed using electronic transcription/translation of spoken language to printed text.  The electronic transcription/translation of spoken language may permit erroneous, or at times, nonsensical words or phrases to be inadvertently transcribed.  Although I have reviewed the note for such errors, some may still exist in this documentation.

## 2020-10-01 ENCOUNTER — TELEPHONE (OUTPATIENT)
Dept: INTERNAL MEDICINE | Facility: CLINIC | Age: 35
End: 2020-10-01

## 2020-10-01 NOTE — TELEPHONE ENCOUNTER
----- Message from CAMILLE Sr sent at 9/30/2020  7:31 PM EDT -----  Regarding: PLEASE FAX ASAP  Please fax office note from 9/30/2022 [facility], darwin Urban, practice manager at fax # provided in media report: [phone]. Patient to have prosthesis fitting next week and needing note faxed asap, thank you.

## 2020-10-05 ENCOUNTER — RESULTS ENCOUNTER (OUTPATIENT)
Dept: INTERNAL MEDICINE | Facility: CLINIC | Age: 35
End: 2020-10-05

## 2020-10-05 DIAGNOSIS — Z00.00 ANNUAL PHYSICAL EXAM: ICD-10-CM

## 2020-11-06 ENCOUNTER — LAB REQUISITION (OUTPATIENT)
Dept: LAB | Facility: HOSPITAL | Age: 35
End: 2020-11-06

## 2020-11-06 DIAGNOSIS — Z00.00 ROUTINE GENERAL MEDICAL EXAMINATION AT A HEALTH CARE FACILITY: ICD-10-CM

## 2020-11-06 LAB
ALBUMIN SERPL-MCNC: 4.1 G/DL (ref 3.5–5.2)
ALBUMIN/GLOB SERPL: 2 G/DL
ALP SERPL-CCNC: 61 U/L (ref 39–117)
ALT SERPL-CCNC: 27 U/L (ref 1–41)
AST SERPL-CCNC: 21 U/L (ref 1–40)
BASOPHILS # BLD AUTO: 0.04 10*3/MM3 (ref 0–0.2)
BASOPHILS NFR BLD AUTO: 1 % (ref 0–1.5)
BILIRUB SERPL-MCNC: 0.3 MG/DL (ref 0–1.2)
BUN SERPL-MCNC: 20 MG/DL (ref 6–20)
BUN/CREAT SERPL: 18.3 (ref 7–25)
CALCIUM SERPL-MCNC: 8.8 MG/DL (ref 8.6–10.5)
CHLORIDE SERPL-SCNC: 107 MMOL/L (ref 98–107)
CHOLEST SERPL-MCNC: 202 MG/DL (ref 0–200)
CO2 SERPL-SCNC: 22 MMOL/L (ref 22–29)
CREAT SERPL-MCNC: 1.09 MG/DL (ref 0.76–1.27)
EOSINOPHIL # BLD AUTO: 0.25 10*3/MM3 (ref 0–0.4)
EOSINOPHIL NFR BLD AUTO: 6 % (ref 0.3–6.2)
ERYTHROCYTE [DISTWIDTH] IN BLOOD BY AUTOMATED COUNT: 12.3 % (ref 12.3–15.4)
GLOBULIN SER CALC-MCNC: 2.1 GM/DL
GLUCOSE SERPL-MCNC: 98 MG/DL (ref 65–99)
HBA1C MFR BLD: 5.3 % (ref 4.8–5.6)
HCT VFR BLD AUTO: 45.8 % (ref 37.5–51)
HDLC SERPL-MCNC: 58 MG/DL (ref 40–60)
HGB BLD-MCNC: 15.8 G/DL (ref 13–17.7)
IMM GRANULOCYTES # BLD AUTO: 0.01 10*3/MM3 (ref 0–0.05)
IMM GRANULOCYTES NFR BLD AUTO: 0.2 % (ref 0–0.5)
LDLC SERPL CALC-MCNC: 131 MG/DL (ref 0–100)
LYMPHOCYTES # BLD AUTO: 1.67 10*3/MM3 (ref 0.7–3.1)
LYMPHOCYTES NFR BLD AUTO: 40.1 % (ref 19.6–45.3)
MCH RBC QN AUTO: 31.2 PG (ref 26.6–33)
MCHC RBC AUTO-ENTMCNC: 34.5 G/DL (ref 31.5–35.7)
MCV RBC AUTO: 90.5 FL (ref 79–97)
MONOCYTES # BLD AUTO: 0.5 10*3/MM3 (ref 0.1–0.9)
MONOCYTES NFR BLD AUTO: 12 % (ref 5–12)
NEUTROPHILS # BLD AUTO: 1.69 10*3/MM3 (ref 1.7–7)
NEUTROPHILS NFR BLD AUTO: 40.7 % (ref 42.7–76)
NRBC BLD AUTO-RTO: 0 /100 WBC (ref 0–0.2)
PLATELET # BLD AUTO: 228 10*3/MM3 (ref 140–450)
POTASSIUM SERPL-SCNC: 4.9 MMOL/L (ref 3.5–5.2)
PROT SERPL-MCNC: 6.2 G/DL (ref 6–8.5)
RBC # BLD AUTO: 5.06 10*6/MM3 (ref 4.14–5.8)
SODIUM SERPL-SCNC: 138 MMOL/L (ref 136–145)
T4 FREE SERPL-MCNC: 1.03 NG/DL (ref 0.93–1.7)
TRIGL SERPL-MCNC: 71 MG/DL (ref 0–150)
TSH SERPL DL<=0.005 MIU/L-ACNC: 1.06 UIU/ML (ref 0.27–4.2)
VLDLC SERPL CALC-MCNC: 13 MG/DL (ref 5–40)
WBC # BLD AUTO: 4.16 10*3/MM3 (ref 3.4–10.8)

## 2020-11-06 PROCEDURE — 36415 COLL VENOUS BLD VENIPUNCTURE: CPT | Performed by: NURSE PRACTITIONER

## 2021-05-13 ENCOUNTER — TELEPHONE (OUTPATIENT)
Dept: INTERNAL MEDICINE | Facility: CLINIC | Age: 36
End: 2021-05-13

## 2021-05-13 NOTE — TELEPHONE ENCOUNTER
Called and informed [facility] that patient has not been seen since 10/20 and does not have any upcoming appointments.

## 2021-07-15 ENCOUNTER — OFFICE VISIT (OUTPATIENT)
Dept: INTERNAL MEDICINE | Facility: CLINIC | Age: 36
End: 2021-07-15

## 2021-07-15 VITALS
DIASTOLIC BLOOD PRESSURE: 74 MMHG | BODY MASS INDEX: 37.74 KG/M2 | WEIGHT: 270.6 LBS | OXYGEN SATURATION: 96 % | SYSTOLIC BLOOD PRESSURE: 118 MMHG | TEMPERATURE: 98.3 F | HEART RATE: 93 BPM

## 2021-07-15 DIAGNOSIS — Z44.9 PROSTHESIS ADJUSTMENT: ICD-10-CM

## 2021-07-15 DIAGNOSIS — S78.119A AMPUTATION ABOVE KNEE (HCC): Primary | ICD-10-CM

## 2021-07-15 PROCEDURE — 99213 OFFICE O/P EST LOW 20 MIN: CPT | Performed by: NURSE PRACTITIONER

## 2021-07-15 NOTE — PROGRESS NOTES
Chief Complaint   Patient presents with   • Leg Pain     changes in prosthetic fit and causing muscle discomfort in opposite leg.       History of Present Illness    36 y.o.male presents for leg pain, prosthesis care.  LLE above knee amputee with prosthesis. Has been having problems with fit of prosthesis for some time now. Rubs on stump causing irritation. Has also had 20 lb intentional wt loss that has caused loosening of fit. Has tried multi inserts for different fit still not right. Is starting to have pain in right leg due to offset of gait. Needs letter for new prosthesis and new order.       Review of Systems   Constitutional: Negative for chills and fever.   Musculoskeletal: Positive for arthralgias and gait problem.   Skin:        Friction spots on stump         PMS[facility]  The following portions of the patient's history were reviewed and updated as appropriate: allergies, current medications, past family history, past medical history, past social history, past surgical history and problem list.     Past Medical History:   Diagnosis Date   • Cancer (CMS/[facility])       Allergies   Allergen Reactions   • Azithromycin Nausea And Vomiting   • Penicillins Rash   • Sulfa Antibiotics Rash      Social History     Tobacco Use   • Smoking status: Never Smoker   • Smokeless tobacco: Current User     Types: Snuff   Vaping Use   • Vaping Use: Never used   Substance Use Topics   • Alcohol use: Yes   • Drug use: No     Past Surgical History:   Procedure Laterality Date   • ABOVE KNEE LEG AMPUTATION        Family History   Problem Relation Age of Onset   • Cancer Mother    • Hypertension Mother    • Thyroid disease Mother    • Cancer Maternal Aunt    • Cancer Maternal Uncle    • Cancer Paternal Aunt    • Cancer Paternal Uncle    • Cancer Maternal Grandmother            Current Outpatient Medications:   •  ibuprofen (ADVIL,MOTRIN) 200 MG tablet, Take 200 mg by mouth Every 6 (Six) Hours As Needed for mild pain (1-3)., Disp: , Rfl:    •  mupirocin (BACTROBAN) 2 % ointment, Apply  topically to the appropriate area as directed 3 (Three) Times a Day., Disp: 22 g, Rfl: 1    VITALS:  /74   Pulse 93   Temp 98.3 °F (36.8 °C)   Wt 123 kg (270 lb 9.6 oz)   SpO2 96%   BMI 37.74 kg/m²     Physical Exam  Vitals reviewed.   Constitutional:       General: He is not in acute distress.  Pulmonary:      Effort: Pulmonary effort is normal. No respiratory distress.   Musculoskeletal:      Right lower leg: Edema present.      Comments: Left lower ext above knee amputee with prosthesis.   Skin:     General: Skin is warm and dry.   Neurological:      Mental Status: He is alert and oriented to person, place, and time.   Psychiatric:         Mood and Affect: Mood normal.           Assessment and Plan    Diagnoses and all orders for this visit:    1. Amputation above knee (CMS/[facility]) (Primary)  -     mupirocin (Bactroban) 2 % ointment; Apply  topically to the appropriate area as directed 3 (Three) Times a Day.  Dispense: 22 g; Refill: 1  Refilled cream.   2. Prosthesis adjustment  See letter for prosthesis medical necessity. New order letter to [facility].     He is having some issues with his fitting of left lower extremity above the knee prosthesis, that is resulting in friction sores.  He has also had about a twenty pound intentional weight loss, so his prosthesis is loose sometimes.  He has tried insertion of foam shims to try to alleviate the issue, but still continues to not be proper fit.       I discussed the patients findings and my recommendations with patient.  Patient was encouraged to keep me informed of any acute changes, lack of improvement, or any new concerning symptoms.  Patient voiced understanding of all instructions and denied further questions.      Follow Up   Return if symptoms worsen or fail to improve.      Electronically signed by:    CAMILLE Sykes  07/15/2021

## 2022-06-10 ENCOUNTER — OFFICE VISIT (OUTPATIENT)
Dept: INTERNAL MEDICINE | Facility: CLINIC | Age: 37
End: 2022-06-10

## 2022-06-10 VITALS
HEART RATE: 74 BPM | DIASTOLIC BLOOD PRESSURE: 74 MMHG | WEIGHT: 278.9 LBS | OXYGEN SATURATION: 96 % | RESPIRATION RATE: 16 BRPM | SYSTOLIC BLOOD PRESSURE: 132 MMHG | TEMPERATURE: 96.9 F | BODY MASS INDEX: 54.76 KG/M2 | HEIGHT: 60 IN

## 2022-06-10 DIAGNOSIS — L02.91 ABSCESS: Primary | ICD-10-CM

## 2022-06-10 PROBLEM — I10 HYPERTENSION: Status: ACTIVE | Noted: 2022-06-10

## 2022-06-10 PROBLEM — C41.9 OSTEOSARCOMA OF BONE (HCC): Status: ACTIVE | Noted: 2022-06-10

## 2022-06-10 PROBLEM — G47.00 INSOMNIA: Status: ACTIVE | Noted: 2022-06-10

## 2022-06-10 PROCEDURE — 99213 OFFICE O/P EST LOW 20 MIN: CPT | Performed by: NURSE PRACTITIONER

## 2022-06-10 RX ORDER — CLINDAMYCIN HYDROCHLORIDE 300 MG/1
300 CAPSULE ORAL 2 TIMES DAILY
Qty: 14 CAPSULE | Refills: 0 | Status: SHIPPED | OUTPATIENT
Start: 2022-06-10 | End: 2022-06-17

## 2022-06-10 NOTE — PROGRESS NOTES
"Chief Complaint  Leg Pain (Boil on left leg because of artificial leg, x 2 week)    Subjective        Enrique Larry presents to [facility]     History of Present Illness  He has had this boil before and it has improved with antibiotics previously.  He has not had to have it cut open.  His prosthetic rubs the area repetitively.  He started noticing it 2 weeks ago.  He had vacation and things got better.  He went to work this week and it got worse.  He thinks it is open, but he isn't sure.  He has another spot near it that is raw.  He started using Mupirocin 2 days ago.  He denies fever, chills, or body aches.    Objective   Vital Signs:  /74   Pulse 74   Temp 96.9 °F (36.1 °C)   Resp 16   Ht 108.3 cm (42.64\")   Wt 127 kg (278 lb 14.4 oz)   SpO2 96%   .86 kg/m²   Estimated body mass index is 107.86 kg/m² as calculated from the following:    Height as of this encounter: 108.3 cm (42.64\").    Weight as of this encounter: 127 kg (278 lb 14.4 oz).      Physical Exam  Vitals reviewed.   Constitutional:       Appearance: Normal appearance.   HENT:      Head: Normocephalic and atraumatic.   Pulmonary:      Effort: Pulmonary effort is normal.   Genitourinary:      Skin:     General: Skin is warm and dry.   Neurological:      General: No focal deficit present.      Mental Status: He is alert and oriented to person, place, and time.   Psychiatric:         Mood and Affect: Mood normal.         Behavior: Behavior normal.         Thought Content: Thought content normal.         Judgment: Judgment normal.            Result Review :                Assessment and Plan   Diagnoses and all orders for this visit:    1. Abscess (Primary) -small area noted to be soft, but large hardened region underneath.  Lancing would likely not produce very much discharge d/t the induration.  He also would not be able to use his prosthesis if I&D was performed.  --Keep affected area clean and " dry.  Reduce friction as much as possible.  --Start clindamycin 300 mg twice a day for 7 days.  Discussed side effects and agrees to use.  --Can apply mupirocin to affected area.  --If symptoms worsen or do not improve, return to clinic or go to urgent care.  -     clindamycin (CLEOCIN) 300 MG capsule; Take 1 capsule by mouth 2 (Two) Times a Day for 7 days.  Dispense: 14 capsule; Refill: 0         Follow Up   Return in about 1 week (around 6/17/2022) for Follow-up.  Patient was given instructions and counseling regarding his condition or for health maintenance advice. Please see specific information pulled into the AVS if appropriate.

## 2022-06-17 ENCOUNTER — OFFICE VISIT (OUTPATIENT)
Dept: INTERNAL MEDICINE | Facility: CLINIC | Age: 37
End: 2022-06-17

## 2022-06-17 VITALS
HEART RATE: 76 BPM | OXYGEN SATURATION: 97 % | TEMPERATURE: 97.3 F | DIASTOLIC BLOOD PRESSURE: 92 MMHG | BODY MASS INDEX: 54.58 KG/M2 | SYSTOLIC BLOOD PRESSURE: 138 MMHG | HEIGHT: 60 IN | WEIGHT: 278 LBS

## 2022-06-17 DIAGNOSIS — L02.224 BOIL, GROIN: Primary | ICD-10-CM

## 2022-06-17 DIAGNOSIS — Z00.00 HEALTHCARE MAINTENANCE: Primary | ICD-10-CM

## 2022-06-17 PROCEDURE — 99213 OFFICE O/P EST LOW 20 MIN: CPT | Performed by: INTERNAL MEDICINE

## 2022-06-17 RX ORDER — DOXYCYCLINE HYCLATE 100 MG/1
100 CAPSULE ORAL 2 TIMES DAILY
Qty: 20 CAPSULE | Refills: 0 | Status: SHIPPED | OUTPATIENT
Start: 2022-06-17 | End: 2022-08-15

## 2022-06-17 NOTE — PROGRESS NOTES
Maty Larry is a 37 y.o. male.   Chief Complaint   Patient presents with   • Abscess       History of Present Illness   1 week f/u on abscess. Seen by NP and prescribed clindamycin. Some improvement.No fever or chills.   The following portions of the patient's history were reviewed and updated as appropriate: allergies, current medications, past family history, past medical history, past social history, past surgical history and problem list.    Review of Systems   Constitutional: Negative for diaphoresis.   Respiratory: Negative for cough and shortness of breath.    Cardiovascular: Negative for chest pain and leg swelling.   Skin:        boil   Neurological: Negative for weakness and headaches.       Objective   Physical Exam  Vitals reviewed.   Skin:         Neurological:      Mental Status: He is alert and oriented to person, place, and time.   Psychiatric:         Mood and Affect: Mood normal.         Behavior: Behavior normal.         Assessment & Plan   Diagnoses and all orders for this visit:    1. Boil, groin (Primary)  -     Ambulatory Referral to Dermatology  -     doxycycline (VIBRAMYCIN) 100 MG capsule; Take 1 capsule by mouth 2 (Two) Times a Day.  Dispense: 20 capsule; Refill: 0

## 2022-06-20 ENCOUNTER — TELEPHONE (OUTPATIENT)
Dept: INTERNAL MEDICINE | Facility: CLINIC | Age: 37
End: 2022-06-20

## 2022-06-20 NOTE — TELEPHONE ENCOUNTER
Caller: Enrique Larry    Relationship to patient: Self    Best call back number: [phone]     What is the call regarding:  PATIENT STATES THAT DR JACK PRESCRIBED DOXYCYCLINE FOR A BOIL, BUT HE NOW HAS BROKEN OUT IN BUMPS AND IS ITCHING.  HE WAS IN THE SUN YESTERDAY, ALSO.  SHOULD HE KEEP TAKING THE MEDICATION?

## 2022-06-21 NOTE — TELEPHONE ENCOUNTER
Patient returned phone call. He stated he was in the sun this weekend but he has agreed to stop taking medication. He would also like to move forward with the Dermatology Referral.

## 2022-06-21 NOTE — TELEPHONE ENCOUNTER
Pt returned Fiona's call; stated he is on lunch until 12:30 & he gets off work around 2:30; does not have good cell service.

## 2022-06-21 NOTE — TELEPHONE ENCOUNTER
Stop the med. Not another antibiotic he can take. Has allergies listed to all other. Can break out with doxycycline while in the sun. If boil not better we need to send him to dermatology.

## 2022-08-08 ENCOUNTER — LAB (OUTPATIENT)
Dept: LAB | Facility: HOSPITAL | Age: 37
End: 2022-08-08

## 2022-08-08 DIAGNOSIS — L08.0 PYODERMA: ICD-10-CM

## 2022-08-08 PROCEDURE — 87070 CULTURE OTHR SPECIMN AEROBIC: CPT

## 2022-08-08 PROCEDURE — 87205 SMEAR GRAM STAIN: CPT

## 2022-08-09 ENCOUNTER — TRANSCRIBE ORDERS (OUTPATIENT)
Dept: LAB | Facility: HOSPITAL | Age: 37
End: 2022-08-09

## 2022-08-09 DIAGNOSIS — L08.0 PYODERMA: Primary | ICD-10-CM

## 2022-08-12 LAB
BACTERIA SPEC AEROBE CULT: NORMAL
GRAM STN SPEC: NORMAL
GRAM STN SPEC: NORMAL

## 2022-08-15 ENCOUNTER — OFFICE VISIT (OUTPATIENT)
Dept: INTERNAL MEDICINE | Facility: CLINIC | Age: 37
End: 2022-08-15

## 2022-08-15 ENCOUNTER — LAB (OUTPATIENT)
Dept: LAB | Facility: HOSPITAL | Age: 37
End: 2022-08-15

## 2022-08-15 VITALS
OXYGEN SATURATION: 99 % | BODY MASS INDEX: 36.4 KG/M2 | DIASTOLIC BLOOD PRESSURE: 82 MMHG | WEIGHT: 260 LBS | HEIGHT: 71 IN | TEMPERATURE: 98.3 F | SYSTOLIC BLOOD PRESSURE: 142 MMHG | HEART RATE: 87 BPM

## 2022-08-15 DIAGNOSIS — L03.115 CELLULITIS OF RIGHT LOWER EXTREMITY: Primary | ICD-10-CM

## 2022-08-15 DIAGNOSIS — Z00.00 HEALTHCARE MAINTENANCE: ICD-10-CM

## 2022-08-15 LAB
ALBUMIN SERPL-MCNC: 4.3 G/DL (ref 3.5–5.2)
ALBUMIN/GLOB SERPL: 1.8 G/DL
ALP SERPL-CCNC: 65 U/L (ref 39–117)
ALT SERPL-CCNC: 19 U/L (ref 1–41)
AST SERPL-CCNC: 12 U/L (ref 1–40)
BASOPHILS # BLD AUTO: 0.03 10*3/MM3 (ref 0–0.2)
BASOPHILS NFR BLD AUTO: 0.4 % (ref 0–1.5)
BILIRUB SERPL-MCNC: 0.5 MG/DL (ref 0–1.2)
BUN SERPL-MCNC: 10 MG/DL (ref 6–20)
BUN/CREAT SERPL: 8.6 (ref 7–25)
CALCIUM SERPL-MCNC: 8.9 MG/DL (ref 8.6–10.5)
CHLORIDE SERPL-SCNC: 100 MMOL/L (ref 98–107)
CHOLEST SERPL-MCNC: 182 MG/DL (ref 0–200)
CO2 SERPL-SCNC: 25.3 MMOL/L (ref 22–29)
CREAT SERPL-MCNC: 1.16 MG/DL (ref 0.76–1.27)
EGFRCR-CYS SERPLBLD CKD-EPI 2021: 82.2 ML/MIN/1.73
EOSINOPHIL # BLD AUTO: 0.07 10*3/MM3 (ref 0–0.4)
EOSINOPHIL NFR BLD AUTO: 0.9 % (ref 0.3–6.2)
ERYTHROCYTE [DISTWIDTH] IN BLOOD BY AUTOMATED COUNT: 12 % (ref 12.3–15.4)
GLOBULIN SER CALC-MCNC: 2.4 GM/DL
GLUCOSE SERPL-MCNC: 96 MG/DL (ref 65–99)
HCT VFR BLD AUTO: 44.3 % (ref 37.5–51)
HDLC SERPL-MCNC: 58 MG/DL (ref 40–60)
HGB BLD-MCNC: 15.3 G/DL (ref 13–17.7)
IMM GRANULOCYTES # BLD AUTO: 0.01 10*3/MM3 (ref 0–0.05)
IMM GRANULOCYTES NFR BLD AUTO: 0.1 % (ref 0–0.5)
LDLC SERPL CALC-MCNC: 107 MG/DL (ref 0–100)
LYMPHOCYTES # BLD AUTO: 1.52 10*3/MM3 (ref 0.7–3.1)
LYMPHOCYTES NFR BLD AUTO: 20.5 % (ref 19.6–45.3)
MCH RBC QN AUTO: 31.1 PG (ref 26.6–33)
MCHC RBC AUTO-ENTMCNC: 34.5 G/DL (ref 31.5–35.7)
MCV RBC AUTO: 90 FL (ref 79–97)
MONOCYTES # BLD AUTO: 0.69 10*3/MM3 (ref 0.1–0.9)
MONOCYTES NFR BLD AUTO: 9.3 % (ref 5–12)
NEUTROPHILS # BLD AUTO: 5.08 10*3/MM3 (ref 1.7–7)
NEUTROPHILS NFR BLD AUTO: 68.8 % (ref 42.7–76)
NRBC BLD AUTO-RTO: 0 /100 WBC (ref 0–0.2)
PLATELET # BLD AUTO: 238 10*3/MM3 (ref 140–450)
POTASSIUM SERPL-SCNC: 4.8 MMOL/L (ref 3.5–5.2)
PROT SERPL-MCNC: 6.7 G/DL (ref 6–8.5)
RBC # BLD AUTO: 4.92 10*6/MM3 (ref 4.14–5.8)
SODIUM SERPL-SCNC: 136 MMOL/L (ref 136–145)
TRIGL SERPL-MCNC: 91 MG/DL (ref 0–150)
TSH SERPL DL<=0.005 MIU/L-ACNC: 1.14 UIU/ML (ref 0.27–4.2)
VLDLC SERPL CALC-MCNC: 17 MG/DL (ref 5–40)
WBC # BLD AUTO: 7.4 10*3/MM3 (ref 3.4–10.8)

## 2022-08-15 PROCEDURE — 99213 OFFICE O/P EST LOW 20 MIN: CPT | Performed by: INTERNAL MEDICINE

## 2022-08-15 PROCEDURE — 96372 THER/PROPH/DIAG INJ SC/IM: CPT | Performed by: INTERNAL MEDICINE

## 2022-08-15 RX ORDER — CEFTRIAXONE 1 G/1
1 INJECTION, POWDER, FOR SOLUTION INTRAMUSCULAR; INTRAVENOUS ONCE
Status: COMPLETED | OUTPATIENT
Start: 2022-08-15 | End: 2022-08-15

## 2022-08-15 RX ORDER — CEPHALEXIN 500 MG/1
500 CAPSULE ORAL 4 TIMES DAILY
Qty: 40 CAPSULE | Refills: 0 | Status: SHIPPED | OUTPATIENT
Start: 2022-08-15 | End: 2022-08-25

## 2022-08-15 RX ADMIN — CEFTRIAXONE 1 G: 1 INJECTION, POWDER, FOR SOLUTION INTRAMUSCULAR; INTRAVENOUS at 09:50

## 2022-08-15 NOTE — PROGRESS NOTES
Internal Medicine Acute Visit    Chief Complaint   Patient presents with   • Rash     Right leg with muscle pain. Had a fever last Friday after work. Benadryl, epsom salt, ice. Had been helping with [employer] cleanup and had several mosquito bites.        HPI  Mr. Larry comes in today due to fever, rash, myalgia. He notes that he recently has been in [place] helping with cleanup efforts. He had many mosquito bites but no know lacerations or trauma. Friday after work had fever to 100.5. He had associated myalgia in his R calf and Saturday developed erythema of R calf. He has been treating with benadryl, epsom salt soaks, ice. Rash and myalgia has spread to his thigh. No further fevers. He did take 2 home COVID-19 tests and a COVID-19 PCR all of which were negative. He notably has been recently treated for a boil in his L groin and had a corticosteroid injection by dermatology earlier this month as well as clindamycin and doxycycline back in June. He has rash to penicillins but has used cephalosporins without issue.       Review of Systems  Review of Systems   Constitutional: Positive for fever.   HENT: Negative.    Respiratory: Negative.    Musculoskeletal: Positive for myalgias.   Skin: Positive for color change, rash and wound (mosquito bites).        Medications  Current Outpatient Medications on File Prior to Visit   Medication Sig Dispense Refill   • ibuprofen (ADVIL,MOTRIN) 200 MG tablet Take 200 mg by mouth Every 6 (Six) Hours As Needed for mild pain (1-3).     • mupirocin (Bactroban) 2 % ointment Apply  topically to the appropriate area as directed 3 (Three) Times a Day. 22 g 1   • [DISCONTINUED] doxycycline (VIBRAMYCIN) 100 MG capsule Take 1 capsule by mouth 2 (Two) Times a Day. 20 capsule 0     No current facility-administered medications on file prior to visit.        Allergies  Allergies   Allergen Reactions   • Azithromycin Nausea And Vomiting   • Penicillins Rash   • Sulfa Antibiotics Rash       PMH  Past  "Medical History:   Diagnosis Date   • Cancer (HCC)        Objective  Visit Vitals  /82   Pulse 87   Temp 98.3 °F (36.8 °C)   Ht 180.3 cm (71\")   Wt 118 kg (260 lb)   SpO2 99%   BMI 36.26 kg/m²        Physical Exam  Physical Exam  Constitutional:       General: He is not in acute distress.     Appearance: He is not ill-appearing or toxic-appearing.   Musculoskeletal:         General: Tenderness (R calf and thigh) and deformity (s/p L AKA) present. No swelling.      Right lower leg: No edema.   Skin:     General: Skin is warm.      Findings: Erythema (patchy erythema of R calf and distal thigh with well defined borders) and lesion (healed mosquito bites on RLE) present.         Results  Results for orders placed or performed in visit on 08/08/22   Wound Culture - Wound, Groin    Specimen: Groin; Wound   Result Value Ref Range    Wound Culture Light growth (2+) Normal Skin Linda     Gram Stain No WBCs seen     Gram Stain Moderate (3+) Gram positive cocci         Assessment and Plan  Diagnoses and all orders for this visit:    Cellulitis of right lower extremity  - Clinically I think he has erysipelas of RLE secondary to mosquito bites while working in [place]  - 1g Rocephin given today. He will start keflex 500mg four times daily tomorrow.  - RTC in 3 days for recheck        Return in about 3 days (around 8/18/2022) for Recheck with Katie Mckeon.  "

## 2022-08-18 ENCOUNTER — OFFICE VISIT (OUTPATIENT)
Dept: INTERNAL MEDICINE | Facility: CLINIC | Age: 37
End: 2022-08-18

## 2022-08-18 VITALS
SYSTOLIC BLOOD PRESSURE: 130 MMHG | RESPIRATION RATE: 16 BRPM | WEIGHT: 260 LBS | HEART RATE: 76 BPM | TEMPERATURE: 97.6 F | HEIGHT: 71 IN | BODY MASS INDEX: 36.4 KG/M2 | DIASTOLIC BLOOD PRESSURE: 80 MMHG | OXYGEN SATURATION: 96 %

## 2022-08-18 DIAGNOSIS — Z51.89 VISIT FOR WOUND CHECK: Primary | ICD-10-CM

## 2022-08-18 PROCEDURE — 99213 OFFICE O/P EST LOW 20 MIN: CPT | Performed by: NURSE PRACTITIONER

## 2022-08-18 NOTE — PROGRESS NOTES
Answers for HPI/ROS submitted by the patient on 2022  What is the primary reason for your visit?: Other  Please describe your symptoms.: Follow up from Monday. Treating a rash on right leg inside thigh and knee.  Have you had these symptoms before?: No  How long have you been having these symptoms?: 5-7 days  Please list any medications you are currently taking for this condition.: Cephalon 4 times a day         Follow Up Office Visit      Date: 2022   Patient Name: Enrique Larry  : 1985   MRN: [id number]     Chief Complaint:    Chief Complaint   Patient presents with   • Rash     Follow up, 3 days, has improved       History of Present Illness: Enrique Larry is a 37 y.o. male who is here today to follow up with recheck of skin infection. He remains compliant with cephalexin (today is day 3) and has not missed any doses. He denies fever or chills. Overall the redness, pain, and swelling are improved compared to Monday.       Subjective      Review of Systems:   Review of Systems   Constitutional: Negative for chills and fever.   Respiratory: Negative for cough, shortness of breath and wheezing.    Cardiovascular: Positive for leg swelling. Negative for chest pain and palpitations.   Gastrointestinal: Negative for diarrhea.   Musculoskeletal: Negative for arthralgias, joint swelling and myalgias.   Skin: Positive for color change and rash.   Neurological: Negative for dizziness, numbness and headache.       I have reviewed the patients family history, social history, past medical history, past surgical history and have updated it as appropriate.     Medications:     Current Outpatient Medications:   •  cephalexin (Keflex) 500 MG capsule, Take 1 capsule by mouth 4 (Four) Times a Day for 10 days., Disp: 40 capsule, Rfl: 0  •  ibuprofen (ADVIL,MOTRIN) 200 MG tablet, Take 200 mg by mouth Every 6 (Six) Hours As Needed for mild pain (1-3)., Disp: , Rfl:   •  mupirocin (Bactroban) 2 % ointment, Apply   "topically to the appropriate area as directed 3 (Three) Times a Day., Disp: 22 g, Rfl: 1    Allergies:   Allergies   Allergen Reactions   • Azithromycin Nausea And Vomiting   • Penicillins Rash   • Sulfa Antibiotics Rash       Objective     Physical Exam: Please see above  Vital Signs:   Vitals:    08/18/22 1655   BP: 130/80   Pulse: 76   Resp: 16   Temp: 97.6 °F (36.4 °C)   SpO2: 96%   Weight: 118 kg (260 lb)   Height: 180.3 cm (71\")   PainSc:   2     Body mass index is 36.26 kg/m².    Physical Exam  Vitals and nursing note reviewed.   Constitutional:       General: He is not in acute distress.     Appearance: He is not ill-appearing.   Cardiovascular:      Rate and Rhythm: Normal rate and regular rhythm.      Heart sounds: Normal heart sounds.   Pulmonary:      Effort: Pulmonary effort is normal. No respiratory distress.      Breath sounds: Normal breath sounds.   Skin:     General: Skin is warm.      Findings: Erythema (areas of patchy redness are decreased in size and a lighter color compared to previously outlined marking) present.   Neurological:      Mental Status: He is alert.         Procedures    Results:   Imaging:     Labs:       Assessment / Plan      Assessment/Plan:   Diagnoses and all orders for this visit:    1. Visit for wound check (Primary)  -complete entire course of keflex as prescribed  -elevate RLE PRN to decrease swelling         Follow Up:   Return if symptoms worsen or fail to improve, for Next scheduled follow up.    CAMILLE Huddleston  [facility] Internal Medicine [place]   "

## 2022-08-30 DIAGNOSIS — S78.119A AMPUTATION ABOVE KNEE: ICD-10-CM

## 2022-08-30 NOTE — TELEPHONE ENCOUNTER
Caller: Enrique Larry    Relationship: Self    Best call back number: [phone]     Requested Prescriptions:   Requested Prescriptions     Pending Prescriptions Disp Refills   • mupirocin (BACTROBAN) 2 % ointment [Pharmacy Med Name: MUPIROCIN 2% OINTMENT] 22 g 1     Sig: APPLY TOPICALLY TO THE APPROPRIATE AREA AS DIRECTED 3 TIMES DAILY        Pharmacy where request should be sent: [address] [phone] [facility] [phone]      Additional details provided by patient: PATIENT IS OUT OF MEDICATION    Does the patient have less than a 3 day supply:  [x] Yes  [] No    Lalo Franco   08/30/22 14:00 EDT

## 2022-08-30 NOTE — TELEPHONE ENCOUNTER
Last Office Visit: 06/17/2022  Next Office Visit:01/13/2023    Labs completed in past 6 months? yes  Labs completed in past year? yes    Last Refill Date: 07/15/2021  Quantity:22 g  Refills:1    Pharmacy:     Please review pended refill request for any changes needed on refills or quantities. Thank you!

## 2022-10-06 ENCOUNTER — OFFICE VISIT (OUTPATIENT)
Dept: INTERNAL MEDICINE | Facility: CLINIC | Age: 37
End: 2022-10-06

## 2022-10-06 VITALS
HEART RATE: 70 BPM | BODY MASS INDEX: 38.5 KG/M2 | RESPIRATION RATE: 16 BRPM | WEIGHT: 275 LBS | TEMPERATURE: 98.2 F | SYSTOLIC BLOOD PRESSURE: 118 MMHG | HEIGHT: 71 IN | OXYGEN SATURATION: 96 % | DIASTOLIC BLOOD PRESSURE: 78 MMHG

## 2022-10-06 DIAGNOSIS — C41.9 OSTEOSARCOMA OF BONE: Primary | ICD-10-CM

## 2022-10-06 DIAGNOSIS — Z97.10 EMPLOYS PROSTHETIC LEG: ICD-10-CM

## 2022-10-06 DIAGNOSIS — Z80.0 FAMILY HISTORY OF COLON CANCER: ICD-10-CM

## 2022-10-06 DIAGNOSIS — E66.09 CLASS 2 OBESITY DUE TO EXCESS CALORIES WITHOUT SERIOUS COMORBIDITY WITH BODY MASS INDEX (BMI) OF 38.0 TO 38.9 IN ADULT: ICD-10-CM

## 2022-10-06 PROCEDURE — 99213 OFFICE O/P EST LOW 20 MIN: CPT | Performed by: NURSE PRACTITIONER

## 2022-10-06 NOTE — PROGRESS NOTES
Answers for HPI/ROS submitted by the patient on 10/5/2022  What is the primary reason for your visit?: Other  Please describe your symptoms.: Prosthetic equipment issues. Need prescription for new foot and liners  Have you had these symptoms before?: Yes  How long have you been having these symptoms?: Greater than 2 weeks  Please describe any probable cause for these symptoms. : Wear and tear        Office Note     Name: Enrique Larry    : 1985     MRN: [id number]     Chief Complaint  Foot Problem (Pt needing order for prosthetic )    Subjective     History of Present Illness:  Enrique Larry is a 37 y.o. male who presents today for left lower extremity prosthetic purposes    Patient continues to demonstrate K4 ambulatory ability with vocational task exceeding normal daily ambulation.  Current foot has a broken torsion brushing and is outside of warrantee, requires replacement to restore function    Please see attached media related to signed order from [facility].  Patient does have a left lower extremity above the knee prosthetic leg, knee and foot.  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  He did receive treatment from the [facility] of a titanium implant as well as chemo x 1 year.  He had that implant for about 3 to 4 years until he broke it.  He has had about 15 surgeries after that time before losing his leg in .  He has had weight fluctuations as well as changes to inflammation of the leg and associated changes to the prosthetic.  Again see above for noted dictation about ambulatory ability.    Patient does have liners of the prosthesis.  Due to his increased activity level related to his job and other outside quality of life factors, he will need replacements of the liners at a faster pace averaging about every 3 to 4 months.    Patient does have a very active job but does require plenty of walking and bending and squatting.  He also enjoys golf.  He does  "work to follow a healthy diet.  We did discuss his BMI  Patient does have a family history of colon cancer via his mom and uncle.  He is interested in a possible colonoscopy.  Order/referral was placed    Review of Systems   Constitutional: Negative for chills, fatigue and fever.   HENT: Negative for sore throat.    Eyes: Negative for visual disturbance.   Respiratory: Negative for cough and shortness of breath.    Cardiovascular: Negative for chest pain.   Gastrointestinal: Negative for abdominal pain.   Skin: Negative for color change.   Allergic/Immunologic: Negative for immunocompromised state.   Neurological: Negative for headaches.   Psychiatric/Behavioral: Negative for behavioral problems.       Objective     Vital Signs  /78   Pulse 70   Temp 98.2 °F (36.8 °C)   Resp 16   Ht 180.3 cm (71\")   Wt 125 kg (275 lb)   SpO2 96%   BMI 38.35 kg/m²   Estimated body mass index is 38.35 kg/m² as calculated from the following:    Height as of this encounter: 180.3 cm (71\").    Weight as of this encounter: 125 kg (275 lb).    Class 2 Severe Obesity (BMI >=35 and <=39.9). Obesity-related health conditions include the following: none. Obesity is unchanged. BMI is is above average; BMI management plan is completed. We discussed portion control and increasing exercise.      PHQ-9 Depression Screening  Little interest or pleasure in doing things?     Feeling down, depressed, or hopeless?     Trouble falling or staying asleep, or sleeping too much?     Feeling tired or having little energy?     Poor appetite or overeating?     Feeling bad about yourself - or that you are a failure or have let yourself or your family down?     Trouble concentrating on things, such as reading the newspaper or watching television?     Moving or speaking so slowly that other people could have noticed? Or the opposite - being so fidgety or restless that you have been moving around a lot more than usual?     Thoughts that you would be " better off dead, or of hurting yourself in some way?     PHQ-9 Total Score     If you checked off any problems, how difficult have these problems made it for you to do your work, take care of things at home, or get along with other people?       PHQ-9 Total Score:      DAVE-7       Physical Exam  Vitals and nursing note reviewed.   Constitutional:       Appearance: Normal appearance.   HENT:      Head: Normocephalic and atraumatic.   Eyes:      Extraocular Movements: Extraocular movements intact.      Pupils: Pupils are equal, round, and reactive to light.   Cardiovascular:      Rate and Rhythm: Normal rate and regular rhythm.      Pulses: Normal pulses.      Heart sounds: Normal heart sounds.   Pulmonary:      Effort: Pulmonary effort is normal.      Breath sounds: Normal breath sounds.   Musculoskeletal:         General: Normal range of motion.      Comments: Prosthetic of left lower extremity currently in place on patient   Skin:     General: Skin is warm and dry.   Neurological:      Mental Status: He is alert and oriented to person, place, and time.   Psychiatric:         Mood and Affect: Mood normal.         Behavior: Behavior normal.          Lab Review:           Assessment and Plan     Diagnoses and all orders for this visit:    1. Osteosarcoma of bone (HCC) (Primary)    2. Employs prosthetic leg    3. Family history of colon cancer  -     Ambulatory Referral For Screening Colonoscopy    4. Class 2 obesity due to excess calories without serious comorbidity with body mass index (BMI) of 38.0 to 38.9 in adult    Plan  See attached documentation of the signed order  See above HPI related to information regarding his prosthetic  Go to ER if any condition worsens or severe  Continue with healthy and active lifestyle  Colonoscopy/referral to GI placed as patient does have family history of colon cancer and he is interested in this  Very pleasant visit with patient today  Upcoming appointment with Dr. Quesada in  January    Follow Up  Return for As scheduled in January with Dr. Quesada.    CAMILLE Adkins    Part of this note may be an electronic transcription/translation of spoken language to printed text using the Dragon Dictation System.

## 2022-12-19 DIAGNOSIS — Z12.11 SCREENING FOR COLON CANCER: Primary | ICD-10-CM

## 2023-01-09 ENCOUNTER — OUTSIDE FACILITY SERVICE (OUTPATIENT)
Dept: GASTROENTEROLOGY | Facility: CLINIC | Age: 38
End: 2023-01-09
Payer: COMMERCIAL

## 2023-01-09 PROCEDURE — 45385 COLONOSCOPY W/LESION REMOVAL: CPT | Performed by: INTERNAL MEDICINE

## 2023-01-09 PROCEDURE — 45380 COLONOSCOPY AND BIOPSY: CPT | Performed by: INTERNAL MEDICINE

## 2023-01-09 PROCEDURE — 88305 TISSUE EXAM BY PATHOLOGIST: CPT

## 2023-01-10 ENCOUNTER — LAB REQUISITION (OUTPATIENT)
Dept: LAB | Facility: HOSPITAL | Age: 38
End: 2023-01-10
Payer: COMMERCIAL

## 2023-01-10 DIAGNOSIS — D12.2 BENIGN NEOPLASM OF ASCENDING COLON: ICD-10-CM

## 2023-01-10 DIAGNOSIS — Z12.11 ENCOUNTER FOR SCREENING FOR MALIGNANT NEOPLASM OF COLON: ICD-10-CM

## 2023-01-10 DIAGNOSIS — D12.8 BENIGN NEOPLASM OF RECTUM: ICD-10-CM

## 2023-01-10 DIAGNOSIS — D12.3 BENIGN NEOPLASM OF TRANSVERSE COLON: ICD-10-CM

## 2023-01-10 DIAGNOSIS — D12.5 BENIGN NEOPLASM OF SIGMOID COLON: ICD-10-CM

## 2023-01-10 DIAGNOSIS — Z80.0 FAMILY HISTORY OF MALIGNANT NEOPLASM OF DIGESTIVE ORGANS: ICD-10-CM

## 2023-01-10 DIAGNOSIS — K57.30 DIVERTICULOSIS OF LARGE INTESTINE WITHOUT PERFORATION OR ABSCESS WITHOUT BLEEDING: ICD-10-CM

## 2023-01-10 DIAGNOSIS — K64.8 OTHER HEMORRHOIDS: ICD-10-CM

## 2023-01-11 ENCOUNTER — OFFICE VISIT (OUTPATIENT)
Dept: INTERNAL MEDICINE | Facility: CLINIC | Age: 38
End: 2023-01-11
Payer: COMMERCIAL

## 2023-01-11 VITALS
OXYGEN SATURATION: 98 % | BODY MASS INDEX: 38.5 KG/M2 | HEART RATE: 90 BPM | WEIGHT: 275 LBS | TEMPERATURE: 97.9 F | DIASTOLIC BLOOD PRESSURE: 82 MMHG | HEIGHT: 71 IN | SYSTOLIC BLOOD PRESSURE: 132 MMHG

## 2023-01-11 DIAGNOSIS — R09.81 NASAL CONGESTION: ICD-10-CM

## 2023-01-11 DIAGNOSIS — R05.9 COUGH IN ADULT: Primary | ICD-10-CM

## 2023-01-11 DIAGNOSIS — J01.00 ACUTE NON-RECURRENT MAXILLARY SINUSITIS: ICD-10-CM

## 2023-01-11 LAB
EXPIRATION DATE: NORMAL
FLUAV AG UPPER RESP QL IA.RAPID: NOT DETECTED
FLUBV AG UPPER RESP QL IA.RAPID: NOT DETECTED
INTERNAL CONTROL: NORMAL
Lab: NORMAL
REF LAB TEST METHOD: NORMAL
SARS-COV-2 AG UPPER RESP QL IA.RAPID: NOT DETECTED

## 2023-01-11 PROCEDURE — 99213 OFFICE O/P EST LOW 20 MIN: CPT | Performed by: INTERNAL MEDICINE

## 2023-01-11 PROCEDURE — 87428 SARSCOV & INF VIR A&B AG IA: CPT | Performed by: INTERNAL MEDICINE

## 2023-01-11 RX ORDER — DOXYCYCLINE HYCLATE 100 MG/1
100 CAPSULE ORAL 2 TIMES DAILY
Qty: 20 CAPSULE | Refills: 0 | Status: SHIPPED | OUTPATIENT
Start: 2023-01-11

## 2023-01-11 NOTE — PROGRESS NOTES
"Maty Larry is a 37 y.o. male.   Chief Complaint   Patient presents with   • Cough   • Nasal Congestion   • Pressure Behind the Eyes     Also in head        History of Present Illness   Cough, nasal congestion and sinus pain since Monday. No fever. Sinu pain worst over last 24 hour. No vomiting. No diarrhea. No abdominal pain.   The following portions of the patient's history were reviewed and updated as appropriate: allergies, current medications, past family history, past medical history, past social history, past surgical history and problem list.    Review of Systems   Constitutional: Negative for fever.   HENT: Positive for rhinorrhea, sinus pressure and sinus pain.    Respiratory: Positive for cough. Negative for shortness of breath.    Cardiovascular: Negative for chest pain and leg swelling.   Gastrointestinal: Negative for diarrhea, nausea and vomiting.   Psychiatric/Behavioral: Negative for confusion.     /82   Pulse 90   Temp 97.9 °F (36.6 °C)   Ht 180.3 cm (70.98\")   Wt 125 kg (275 lb)   SpO2 98%   BMI 38.37 kg/m²     Objective   Physical Exam  Vitals reviewed.   HENT:      Nose:      Comments: B/l maxillary tenderness. Erythema and edema right turbinate.   Cardiovascular:      Rate and Rhythm: Normal rate and regular rhythm.   Pulmonary:      Effort: No respiratory distress.      Breath sounds: No wheezing.   Neurological:      Mental Status: He is alert and oriented to person, place, and time.   Psychiatric:         Mood and Affect: Mood normal.         Behavior: Behavior normal.         Assessment & Plan   Diagnoses and all orders for this visit:    1. Cough in adult (Primary)  -     POCT SARS-CoV-2 Antigen STANLEY + Flu  Fl a and b negative. Covid negative  2. Nasal congestion  -     POCT SARS-CoV-2 Antigen STANLEY + Flu  Flu and b negative Covid negative  3. Acute non-recurrent maxillary sinusitis  -     doxycycline (VIBRAMYCIN) 100 MG capsule; Take 1 capsule by mouth 2 (Two) " Times a Day.  Dispense: 20 capsule; Refill: 0

## 2023-05-04 ENCOUNTER — OFFICE VISIT (OUTPATIENT)
Dept: INTERNAL MEDICINE | Facility: CLINIC | Age: 38
End: 2023-05-04
Payer: COMMERCIAL

## 2023-05-04 VITALS
SYSTOLIC BLOOD PRESSURE: 132 MMHG | OXYGEN SATURATION: 98 % | WEIGHT: 269 LBS | TEMPERATURE: 97.1 F | HEIGHT: 71 IN | DIASTOLIC BLOOD PRESSURE: 80 MMHG | HEART RATE: 78 BPM | BODY MASS INDEX: 37.66 KG/M2

## 2023-05-04 DIAGNOSIS — H61.21 IMPACTED CERUMEN OF RIGHT EAR: Primary | ICD-10-CM

## 2023-05-04 PROCEDURE — 99213 OFFICE O/P EST LOW 20 MIN: CPT | Performed by: INTERNAL MEDICINE

## 2023-05-04 NOTE — PROGRESS NOTES
"Maty Larry is a 37 y.o. male.   Chief Complaint   Patient presents with   • Earache       History of Present Illness   Right ear pain yesterday but has improved today. Has wax in ear. No fever.   The following portions of the patient's history were reviewed and updated as appropriate: allergies, current medications, past family history, past medical history, past social history, past surgical history and problem list.    Review of Systems   Constitutional: Negative for fever.   HENT: Positive for ear pain.    Respiratory: Negative for cough and shortness of breath.    Cardiovascular: Negative for chest pain and leg swelling.   Gastrointestinal: Negative for nausea and vomiting.     /80   Pulse 78   Temp 97.1 °F (36.2 °C)   Ht 180.3 cm (71\")   Wt 122 kg (269 lb)   SpO2 98%   BMI 37.52 kg/m²     Objective   Physical Exam  Vitals reviewed.   HENT:      Right Ear: There is impacted cerumen.   Cardiovascular:      Rate and Rhythm: Tachycardia present.   Neurological:      Mental Status: He is oriented to person, place, and time.         Assessment & Plan   Diagnoses and all orders for this visit:    1. Impacted cerumen of right ear (Primary)  -     Ambulatory Referral to ENT (Otolaryngology)  (KISHA Hernandez)Small amount removed in office. Unable to completely remove cerumen in right ear, will send to ENT             "

## 2023-05-08 ENCOUNTER — TELEPHONE (OUTPATIENT)
Dept: INTERNAL MEDICINE | Facility: CLINIC | Age: 38
End: 2023-05-08
Payer: COMMERCIAL

## 2023-05-08 NOTE — TELEPHONE ENCOUNTER
Caller: Enrique Larry    Relationship: Self    Best call back number: [phone]    What is the best time to reach you: ANY    Who are you requesting to speak with (clinical staff, provider,  specific staff member): REFERRAL COORDINATOR    What was the call regarding: PATIENT IS CALLING TO FOLLOW UP ON HIS EXISTING REFERRAL FOR AN ENT. PATIENT SAID THIS WAS PUT IN AS URGENT. HE HAS NOT HEARD ANYTHING FROM THEM YET AND IS WANTING TO KNOW WHAT IS GOING ON. IS THIS SOMETHING THE OFFICE SCHEDULES OR SOMETHING HE HAS TO SCHEDULE HIMSELF? IF IT IS SOMETHING HE HAS TO CALL TO SCHEDULE PLEASE PROVIDE HIM WITH THE NAME AND PHONE NUMBER OF THE PRACTICE.    Do you require a callback: YES

## 2023-05-19 ENCOUNTER — TELEPHONE (OUTPATIENT)
Dept: INTERNAL MEDICINE | Facility: CLINIC | Age: 38
End: 2023-05-19
Payer: COMMERCIAL

## 2023-05-19 NOTE — TELEPHONE ENCOUNTER
Caller: Enrique Larry    Relationship: Self    Best call back number: [phone]      What specialty or service is being requested: ENT/OTOLARYNGOLOGY      Any additional details: PATIENT STATED HE HAS CALLED TO CHECK ON THE REFERRAL BEFORE BUT HAS NOT HEARD ANYTHING. REQUEST CALLBACK TODAY WITH STATUS

## 2023-06-26 ENCOUNTER — TELEPHONE (OUTPATIENT)
Dept: INTERNAL MEDICINE | Facility: CLINIC | Age: 38
End: 2023-06-26

## 2023-06-26 NOTE — TELEPHONE ENCOUNTER
Contacted patient, patient is agreeable to plan.    PSR- please patient for left ulnar nerve block in office with ultrasound. DX left hand pain M79.642, Neuralgia of left upper extremity M79.2, Neuropathic pain M79.2    Patient will come in for an appt so we have sufficient documentation for DME equipment. Pt notified.

## 2023-06-26 NOTE — TELEPHONE ENCOUNTER
Caller: Enrique Larry    Relationship: Self    Best call back number: [phone]    What orders are you requesting (i.e. lab or imaging):RENEWAL OF  SOCKET LINES AND HARDWARE  FOR HIS PROSTHETIC LEG DUE TO WHERE AND TEAR        Where will you receive your lab/imaging services: [facility]     Additional notes: THE PATIENT WOULD LIKE TO GET REPLACEMENT EQUIPMENT FOR HIS PROSTHETIC LEG PLEASE CALL PATIENT TO LET HIM KNOW IF AN ORDER CAN BE PUT IN HE STATES THAT HE WILL MAKE AN APPOINTMENT IF HE NEEDS TO

## 2023-06-27 PROBLEM — Z89.612 ACQUIRED ABSENCE OF LEFT LEG ABOVE KNEE: Status: ACTIVE | Noted: 2023-06-27

## 2023-08-01 ENCOUNTER — OFFICE VISIT (OUTPATIENT)
Dept: INTERNAL MEDICINE | Facility: CLINIC | Age: 38
End: 2023-08-01
Payer: COMMERCIAL

## 2023-08-01 VITALS
SYSTOLIC BLOOD PRESSURE: 118 MMHG | OXYGEN SATURATION: 98 % | WEIGHT: 266 LBS | BODY MASS INDEX: 37.24 KG/M2 | HEART RATE: 100 BPM | HEIGHT: 71 IN | TEMPERATURE: 97.3 F | DIASTOLIC BLOOD PRESSURE: 80 MMHG

## 2023-08-01 DIAGNOSIS — Z00.00 HEALTHCARE MAINTENANCE: Primary | ICD-10-CM

## 2023-08-01 LAB
BILIRUB BLD-MCNC: NEGATIVE MG/DL
CLARITY, POC: CLEAR
COLOR UR: YELLOW
EXPIRATION DATE: NORMAL
GLUCOSE UR STRIP-MCNC: NEGATIVE MG/DL
KETONES UR QL: NEGATIVE
LEUKOCYTE EST, POC: NEGATIVE
Lab: NORMAL
NITRITE UR-MCNC: NEGATIVE MG/ML
PH UR: 6 [PH] (ref 5–8)
PROT UR STRIP-MCNC: NEGATIVE MG/DL
RBC # UR STRIP: NEGATIVE /UL
SP GR UR: 1 (ref 1–1.03)
UROBILINOGEN UR QL: NORMAL

## 2023-08-01 PROCEDURE — 81003 URINALYSIS AUTO W/O SCOPE: CPT | Performed by: INTERNAL MEDICINE

## 2023-08-01 PROCEDURE — 99396 PREV VISIT EST AGE 40-64: CPT | Performed by: INTERNAL MEDICINE

## 2023-08-01 RX ORDER — TRIAMCINOLONE ACETONIDE 1 MG/G
CREAM TOPICAL
COMMUNITY
Start: 2023-07-06

## 2024-05-07 ENCOUNTER — HOSPITAL ENCOUNTER (OUTPATIENT)
Facility: HOSPITAL | Age: 39
Discharge: HOME | End: 2024-05-07
Payer: COMMERCIAL

## 2024-05-07 ENCOUNTER — LAB (OUTPATIENT)
Dept: LAB | Facility: HOSPITAL | Age: 39
End: 2024-05-07
Payer: COMMERCIAL

## 2024-05-07 ENCOUNTER — OFFICE VISIT (OUTPATIENT)
Dept: INTERNAL MEDICINE | Facility: CLINIC | Age: 39
End: 2024-05-07
Payer: COMMERCIAL

## 2024-05-07 VITALS
HEIGHT: 71 IN | WEIGHT: 284.4 LBS | DIASTOLIC BLOOD PRESSURE: 86 MMHG | OXYGEN SATURATION: 96 % | HEART RATE: 82 BPM | BODY MASS INDEX: 39.81 KG/M2 | SYSTOLIC BLOOD PRESSURE: 136 MMHG | TEMPERATURE: 98.7 F

## 2024-05-07 DIAGNOSIS — R59.9 ENLARGED LYMPH NODE: ICD-10-CM

## 2024-05-07 DIAGNOSIS — R11.0 NAUSEA: ICD-10-CM

## 2024-05-07 DIAGNOSIS — R10.32 LLQ PAIN: ICD-10-CM

## 2024-05-07 DIAGNOSIS — Z00.00 HEALTHCARE MAINTENANCE: ICD-10-CM

## 2024-05-07 DIAGNOSIS — R59.9 ENLARGED LYMPH NODE: Primary | ICD-10-CM

## 2024-05-07 LAB
BASOPHILS # BLD AUTO: 0.04 10*3/MM3 (ref 0–0.2)
BASOPHILS NFR BLD AUTO: 0.7 % (ref 0–1.5)
DEPRECATED RDW RBC AUTO: 43.3 FL (ref 37–54)
EOSINOPHIL # BLD AUTO: 0.08 10*3/MM3 (ref 0–0.4)
EOSINOPHIL NFR BLD AUTO: 1.5 % (ref 0.3–6.2)
ERYTHROCYTE [DISTWIDTH] IN BLOOD BY AUTOMATED COUNT: 12.6 % (ref 12.3–15.4)
HCT VFR BLD AUTO: 45.8 % (ref 37.5–51)
HGB BLD-MCNC: 15.3 G/DL (ref 13–17.7)
IMM GRANULOCYTES # BLD AUTO: 0.01 10*3/MM3 (ref 0–0.05)
IMM GRANULOCYTES NFR BLD AUTO: 0.2 % (ref 0–0.5)
LYMPHOCYTES # BLD AUTO: 1.6 10*3/MM3 (ref 0.7–3.1)
LYMPHOCYTES NFR BLD AUTO: 29.6 % (ref 19.6–45.3)
MCH RBC QN AUTO: 31 PG (ref 26.6–33)
MCHC RBC AUTO-ENTMCNC: 33.4 G/DL (ref 31.5–35.7)
MCV RBC AUTO: 92.9 FL (ref 79–97)
MONOCYTES # BLD AUTO: 0.52 10*3/MM3 (ref 0.1–0.9)
MONOCYTES NFR BLD AUTO: 9.6 % (ref 5–12)
NEUTROPHILS NFR BLD AUTO: 3.16 10*3/MM3 (ref 1.7–7)
NEUTROPHILS NFR BLD AUTO: 58.4 % (ref 42.7–76)
NRBC BLD AUTO-RTO: 0 /100 WBC (ref 0–0.2)
PLATELET # BLD AUTO: 237 10*3/MM3 (ref 140–450)
PMV BLD AUTO: 11.4 FL (ref 6–12)
RBC # BLD AUTO: 4.93 10*6/MM3 (ref 4.14–5.8)
WBC NRBC COR # BLD AUTO: 5.41 10*3/MM3 (ref 3.4–10.8)

## 2024-05-07 PROCEDURE — 74178 CT ABD&PLV WO CNTR FLWD CNTR: CPT

## 2024-05-07 PROCEDURE — 99214 OFFICE O/P EST MOD 30 MIN: CPT | Performed by: INTERNAL MEDICINE

## 2024-05-07 PROCEDURE — 84443 ASSAY THYROID STIM HORMONE: CPT

## 2024-05-07 PROCEDURE — 85025 COMPLETE CBC W/AUTO DIFF WBC: CPT

## 2024-05-07 PROCEDURE — 80053 COMPREHEN METABOLIC PANEL: CPT

## 2024-05-07 PROCEDURE — 25510000001 IOPAMIDOL 61 % SOLUTION: Performed by: INTERNAL MEDICINE

## 2024-05-07 RX ADMIN — IOPAMIDOL 90 ML: 612 INJECTION, SOLUTION INTRAVENOUS at 17:51

## 2024-05-08 ENCOUNTER — TELEPHONE (OUTPATIENT)
Dept: INTERNAL MEDICINE | Facility: CLINIC | Age: 39
End: 2024-05-08

## 2024-05-08 ENCOUNTER — TELEPHONE (OUTPATIENT)
Dept: INTERNAL MEDICINE | Facility: CLINIC | Age: 39
End: 2024-05-08
Payer: COMMERCIAL

## 2024-05-08 DIAGNOSIS — K63.9 BOWEL WALL THICKENING: ICD-10-CM

## 2024-05-08 DIAGNOSIS — N32.89 BLADDER WALL THICKENING: Primary | ICD-10-CM

## 2024-05-08 LAB
ALBUMIN SERPL-MCNC: 4.4 G/DL (ref 3.5–5.2)
ALBUMIN/GLOB SERPL: 1.8 G/DL
ALP SERPL-CCNC: 68 U/L (ref 39–117)
ALT SERPL W P-5'-P-CCNC: 24 U/L (ref 1–41)
ANION GAP SERPL CALCULATED.3IONS-SCNC: 13 MMOL/L (ref 5–15)
AST SERPL-CCNC: 18 U/L (ref 1–40)
BILIRUB SERPL-MCNC: 0.3 MG/DL (ref 0–1.2)
BUN SERPL-MCNC: 11 MG/DL (ref 6–20)
BUN/CREAT SERPL: 10.1 (ref 7–25)
CALCIUM SPEC-SCNC: 8.6 MG/DL (ref 8.6–10.5)
CHLORIDE SERPL-SCNC: 104 MMOL/L (ref 98–107)
CO2 SERPL-SCNC: 21 MMOL/L (ref 22–29)
CREAT SERPL-MCNC: 1.09 MG/DL (ref 0.76–1.27)
EGFRCR SERPLBLD CKD-EPI 2021: 88 ML/MIN/1.73
GLOBULIN UR ELPH-MCNC: 2.5 GM/DL
GLUCOSE SERPL-MCNC: 100 MG/DL (ref 65–99)
POTASSIUM SERPL-SCNC: 4.2 MMOL/L (ref 3.5–5.2)
PROT SERPL-MCNC: 6.9 G/DL (ref 6–8.5)
SODIUM SERPL-SCNC: 138 MMOL/L (ref 136–145)
TSH SERPL DL<=0.05 MIU/L-ACNC: 0.94 UIU/ML (ref 0.27–4.2)

## 2024-05-08 RX ORDER — DOXYCYCLINE HYCLATE 100 MG/1
100 CAPSULE ORAL 2 TIMES DAILY
Qty: 20 CAPSULE | Refills: 0 | Status: SHIPPED | OUTPATIENT
Start: 2024-05-08

## 2024-05-08 NOTE — TELEPHONE ENCOUNTER
Caller: Enrique Larry    Relationship: Self    Best call back number: [phone]      Additional notes: PATIENT STATES HE RECEIVED A CALL TODAY STATING TWO REFERRALS HAVE BEEN PLACED FOR HIM AND WOULD LIKE A CALLBACK TO RECEIVE FURTHER INFORMATION REGARDING THE REFERRALS.

## 2024-05-08 NOTE — TELEPHONE ENCOUNTER
Caller: Enrique Larry    Relationship: Self    Best call back number: [phone]     What was the call regarding: ON LEFT SIDE OF PATIENTS NECK HE HAS SWELLING AND TENDERNESS, WILL THE ANTIBIOTICS HELP OR SHOULD HE COME BACK IN FOR AN EXAM. HE FORGOT TO MENTION THIS YESTERDAY.       Is it okay if the provider responds through MyChart: YES

## 2024-05-09 NOTE — TELEPHONE ENCOUNTER
Pt notified that someone will be calling him to schedule those appts when the referral is approved.

## 2024-05-13 ENCOUNTER — TELEPHONE (OUTPATIENT)
Dept: INTERNAL MEDICINE | Facility: CLINIC | Age: 39
End: 2024-05-13

## 2024-05-13 RX ORDER — CEPHALEXIN 250 MG/1
250 CAPSULE ORAL 3 TIMES DAILY
Qty: 21 CAPSULE | Refills: 0 | Status: SHIPPED | OUTPATIENT
Start: 2024-05-13

## 2024-05-13 NOTE — TELEPHONE ENCOUNTER
Caller: Enrique Larry    Relationship: Self    Best call back number: [phone]     Which medication are you concerned about: DOXYCYCLINE    Who prescribed you this medication: DR JACK    When did you start taking this medication: LAST WEDNESDAY    What are your concerns: ITCHY RASH HAS DEVELOPED ON NECK AND SHOULDER AND IS SPREADING DOWNWARDS    How long have you had these concerns: STARTED SATURDAY

## 2024-05-22 ENCOUNTER — TELEPHONE (OUTPATIENT)
Dept: GASTROENTEROLOGY | Facility: CLINIC | Age: 39
End: 2024-05-22
Payer: COMMERCIAL

## 2024-05-22 ENCOUNTER — OFFICE VISIT (OUTPATIENT)
Dept: GASTROENTEROLOGY | Facility: CLINIC | Age: 39
End: 2024-05-22
Payer: COMMERCIAL

## 2024-05-22 VITALS
SYSTOLIC BLOOD PRESSURE: 128 MMHG | DIASTOLIC BLOOD PRESSURE: 82 MMHG | HEIGHT: 71 IN | OXYGEN SATURATION: 97 % | BODY MASS INDEX: 40.32 KG/M2 | HEART RATE: 78 BPM | TEMPERATURE: 97.6 F | WEIGHT: 288 LBS

## 2024-05-22 DIAGNOSIS — D12.6 TUBULAR ADENOMA OF COLON: ICD-10-CM

## 2024-05-22 DIAGNOSIS — K58.0 IRRITABLE BOWEL SYNDROME WITH DIARRHEA: Primary | ICD-10-CM

## 2024-05-22 DIAGNOSIS — R93.5 ABNORMAL CT OF THE ABDOMEN: ICD-10-CM

## 2024-05-22 RX ORDER — SODIUM PICOSULFATE, MAGNESIUM OXIDE, AND ANHYDROUS CITRIC ACID 10; 3.5; 12 MG/160ML; G/160ML; G/160ML
350 LIQUID ORAL TAKE AS DIRECTED
Qty: 320 ML | Refills: 0 | Status: SHIPPED | OUTPATIENT
Start: 2024-05-22

## 2024-05-22 NOTE — PROGRESS NOTES
Follow Up      Patient Name: Enrique Larry  : 1985   MRN: [id number]     Chief Complaint:    Chief Complaint   Patient presents with    CT SCAN ABNORMAL       History of Present Illness: Enrique Larry is a 38 y.o. male, PMH includes osteosarcoma s/p AKA LLE extremity, who is here today for follow up on abnormal CT A/P.     Pt reports intermittent left groin boil / inflamed lymph node, which he attributes to his prosthetic leg. His PCP ordered CT A/P, below. Pt reports baseline 3 or more loose, urgent stools per day, associated with mild lower abdominal cramping and urgency. Patient denies associated fever, chills, indigestion, nausea, vomiting, constipation, hematemesis, dysphagia, hematochezia, melena, bloating, weight loss or gain, dysuria, jaundice or bruising.    Pt reports mother with h/o colon cancer in her 40s.     CT A/P w/wo contrst 2024: Mild wall thickening of sigmoid colon is nonspecific and likely due to under distention. Mild colitis is not completely excluded. Prominent left inguinal lymph node. Circumferential bladder wall thickening, concerning for cystitis.     CSY 2023 with Dr. Love. Adequate bowel prep conditions. Internal hemorrhoids. Diverticulosis in left colon. Two sessile polyps (tubular adenoma) in ascending colon, 5-7mm in size, resected and retrieved. Two sessile polyps (tubular adenoma) in transverse colon, 5-7mm in size, resected and retrieved. 10mm polyp (tubular adenoma) in sigmoid colon, resected and retrieved. Diminutive polyp in sigmoid colon, bx consistent with tubular adenoma. Diminutive polyp (tubular adenoma) in rectum, resected and retrieved. Recommend repeat CSY in 3 years.     Subjective      Review of Systems:   Review of Systems   Constitutional:  Negative for appetite change, chills, diaphoresis, fatigue, fever, unexpected weight gain and unexpected weight loss.   HENT:  Negative for drooling, facial swelling, mouth sores, nosebleeds,  rhinorrhea, sore throat, swollen glands, tinnitus and trouble swallowing.    Eyes: Negative.    Respiratory:  Negative for choking, chest tightness and shortness of breath.    Gastrointestinal:  Positive for abdominal pain (mild, lowerd) and diarrhea (loose stools). Negative for anal bleeding, blood in stool, constipation, nausea, vomiting, GERD and indigestion.   Endocrine: Negative.    Genitourinary:  Negative for dysuria, flank pain and hematuria.   Musculoskeletal:  Negative for arthralgias, back pain, myalgias and neck pain.   Skin:  Negative for color change, dry skin, pallor and bruise.   Neurological:  Negative for dizziness, tremors, syncope, weakness and numbness.   Psychiatric/Behavioral:  Negative for decreased concentration, hallucinations and self-injury. The patient is not nervous/anxious.    All other systems reviewed and are negative.      Medications:     Current Outpatient Medications:     cephalexin (Keflex) 250 MG capsule, Take 1 capsule by mouth 3 (Three) Times a Day. (Patient not taking: Reported on 5/22/2024), Disp: 21 capsule, Rfl: 0    doxycycline (VIBRAMYCIN) 100 MG capsule, Take 1 capsule by mouth 2 (Two) Times a Day. (Patient not taking: Reported on 5/22/2024), Disp: 20 capsule, Rfl: 0    mupirocin (BACTROBAN) 2 % ointment, APPLY TOPICALLY TO THE APPROPRIATE AREA AS DIRECTED 3 TIMES DAILY (Patient not taking: Reported on 5/22/2024), Disp: 22 g, Rfl: 1    triamcinolone (KENALOG) 0.1 % cream, , Disp: , Rfl:     Allergies:   Allergies   Allergen Reactions    Azithromycin Nausea And Vomiting    Penicillins Rash    Sulfa Antibiotics Rash       Social History:   Social History     Socioeconomic History    Marital status:    Tobacco Use    Smoking status: Never     Passive exposure: Never    Smokeless tobacco: Current     Types: Snuff    Tobacco comments:     Smokeless tobacco pouches can per day   Vaping Use    Vaping status: Never Used   Substance and Sexual Activity    Alcohol use:  "Yes     Alcohol/week: 10.0 standard drinks of alcohol     Types: 10 Cans of beer per week    Drug use: No    Sexual activity: Yes     Partners: Female        Surgical History:   Past Surgical History:   Procedure Laterality Date    ABOVE KNEE LEG AMPUTATION      COLONOSCOPY  01/09/2023        Medical History:   Past Medical History:   Diagnosis Date    Cancer         Objective     Physical Exam:  Vital Signs:   Vitals:    05/22/24 0805   BP: 128/82   BP Location: Left arm   Patient Position: Sitting   Cuff Size: Adult   Pulse: 78   Temp: 97.6 °F (36.4 °C)   TempSrc: Temporal   SpO2: 97%   Weight: 131 kg (288 lb)   Height: 180.3 cm (71\")     Body mass index is 40.17 kg/m².     Physical Exam  Vitals and nursing note reviewed.   Constitutional:       General: He is not in acute distress.     Appearance: Normal appearance. He is not ill-appearing or diaphoretic.      Comments: BMI 40.17   HENT:      Head: Normocephalic and atraumatic.      Right Ear: External ear normal.      Left Ear: External ear normal.      Nose: Nose normal.      Mouth/Throat:      Mouth: Mucous membranes are moist.      Pharynx: Oropharynx is clear.   Eyes:      Conjunctiva/sclera: Conjunctivae normal.      Pupils: Pupils are equal, round, and reactive to light.   Cardiovascular:      Rate and Rhythm: Normal rate and regular rhythm.      Pulses: Normal pulses.      Heart sounds: Normal heart sounds.   Pulmonary:      Effort: Pulmonary effort is normal.      Breath sounds: Normal breath sounds.   Abdominal:      General: Abdomen is flat. There is no distension.      Tenderness: There is no abdominal tenderness. There is no guarding or rebound.   Musculoskeletal:         General: Normal range of motion.      Cervical back: Normal range of motion.   Skin:     General: Skin is warm and dry.      Coloration: Skin is not jaundiced or pale.   Neurological:      General: No focal deficit present.      Mental Status: He is alert and oriented to person, " place, and time. Mental status is at baseline.   Psychiatric:         Mood and Affect: Mood normal.         Assessment / Plan      Assessment/Plan:   There are no diagnoses linked to this encounter.     Abnormal CT A/P, sigmoid wall thickening  Tubular adenoma of colon  IBS-D  Fhx colon cancer, mother   - rx for Xifaxan 550mg TID x 14 days sent to pharmacy    - previous office notes, hospital records, labs, imaging, endoscopy and pathology reports reviewed with patient    - schedule for CSY   - follow up in clinic after completion of above studies   - call clinic at any time for questions or new / worsened sx    Follow Up:   Return for Next scheduled follow up.    Plan of care reviewed with the patient at the conclusion of today's visit.  Education was provided regarding diagnosis, management, and any prescribed or recommended OTC medications.  Patient verbalized understanding of and agreement with management plan.     NOTE TO PATIENT: The 21st Century Cures Act makes medical notes like these available to patients in the interest of transparency. However, be advised this is a medical document. It is intended as peer to peer communication. It is written in medical language and may contain abbreviations or verbiage that are unfamiliar. It may appear blunt or direct. Medical documents are intended to carry relevant information, facts as evident, and the clinical opinion of the practitioner.     Time Statement:   Discussed plan of care in detail with patient today. Patient verbally understands and agrees. I have spent 30 minutes reviewing available diagnostics, obtaining history, examining the patient, developing a treatment plan, and educating the patient on disease process and plan of care.     Yanira Franklin PA-C   [facility]

## 2024-05-24 ENCOUNTER — PRIOR AUTHORIZATION (OUTPATIENT)
Dept: GASTROENTEROLOGY | Facility: CLINIC | Age: 39
End: 2024-05-24
Payer: COMMERCIAL

## 2024-05-31 ENCOUNTER — OUTSIDE FACILITY SERVICE (OUTPATIENT)
Dept: GASTROENTEROLOGY | Facility: CLINIC | Age: 39
End: 2024-05-31
Payer: COMMERCIAL

## 2024-05-31 PROCEDURE — 45385 COLONOSCOPY W/LESION REMOVAL: CPT | Performed by: INTERNAL MEDICINE

## 2024-05-31 PROCEDURE — 45380 COLONOSCOPY AND BIOPSY: CPT | Performed by: INTERNAL MEDICINE

## 2024-05-31 PROCEDURE — 88305 TISSUE EXAM BY PATHOLOGIST: CPT | Performed by: INTERNAL MEDICINE

## 2024-06-03 ENCOUNTER — LAB REQUISITION (OUTPATIENT)
Dept: LAB | Facility: HOSPITAL | Age: 39
End: 2024-06-03
Payer: COMMERCIAL

## 2024-06-03 DIAGNOSIS — Z80.0 FAMILY HISTORY OF MALIGNANT NEOPLASM OF DIGESTIVE ORGANS: ICD-10-CM

## 2024-06-03 DIAGNOSIS — D12.5 BENIGN NEOPLASM OF SIGMOID COLON: ICD-10-CM

## 2024-06-03 DIAGNOSIS — Z86.010 PERSONAL HISTORY OF COLONIC POLYPS: ICD-10-CM

## 2024-06-03 DIAGNOSIS — K57.30 DIVERTICULOSIS OF LARGE INTESTINE WITHOUT PERFORATION OR ABSCESS WITHOUT BLEEDING: ICD-10-CM

## 2024-06-03 DIAGNOSIS — K64.8 OTHER HEMORRHOIDS: ICD-10-CM

## 2024-06-04 LAB — REF LAB TEST METHOD: NORMAL

## 2024-06-14 ENCOUNTER — TELEPHONE (OUTPATIENT)
Dept: INTERNAL MEDICINE | Facility: CLINIC | Age: 39
End: 2024-06-14

## 2024-06-14 NOTE — TELEPHONE ENCOUNTER
Caller: Enrique Larry    Relationship: Self    Best call back number: [phone]        What specialty or service is being requested: UROLOGY       Any additional details: THE PATIENT STATES THAT HE WAS SUPPOSED TO BE REFERRED TO UROLOGY BUT HAS NOT HEARD ANYTHING TO SET THAT UP YET HE WOULD LIKE TO GET A CALL BACK WITH AN UPDATE ON THE REFERRAL

## 2024-07-10 ENCOUNTER — OFFICE VISIT (OUTPATIENT)
Dept: INTERNAL MEDICINE | Facility: CLINIC | Age: 39
End: 2024-07-10
Payer: COMMERCIAL

## 2024-07-10 VITALS
TEMPERATURE: 96.5 F | SYSTOLIC BLOOD PRESSURE: 130 MMHG | HEIGHT: 71 IN | DIASTOLIC BLOOD PRESSURE: 80 MMHG | BODY MASS INDEX: 39.51 KG/M2 | WEIGHT: 282.2 LBS | OXYGEN SATURATION: 96 % | HEART RATE: 90 BPM

## 2024-07-10 DIAGNOSIS — Z89.612 ACQUIRED ABSENCE OF LEFT LEG ABOVE KNEE: Primary | ICD-10-CM

## 2024-07-10 PROCEDURE — 99213 OFFICE O/P EST LOW 20 MIN: CPT | Performed by: INTERNAL MEDICINE

## 2024-07-10 RX ORDER — CALCIUM POLYCARBOPHIL 625 MG
TABLET ORAL DAILY
COMMUNITY

## 2024-07-10 NOTE — PROGRESS NOTES
"Subjective   Enrique Larry is a 39 y.o. male.     History of Present Illness   Mr. Larry is indicated for an 3x microprocessor knee to replace his existing device of same likeness ( must be waterproof). He will require replacement gel liners to protect against skin breakdown from recent dermatological issues. He is newly  with a new baby and is highly motivated to maintain and emphasis on his health. He has battled weight gain as a longstanding amputee with limb loss secondary to cancer in college. Water aerobics and other water activities have been highly successful in maintaining his weight and over all well being. He is also an avid golfer who frequently walks with a pushcart for exercise while playing. His ADLs, recreational and vocational tasks all warrant continued utilization of an OttTiger Pistol x 3 MPK  The following portions of the patient's history were reviewed and updated as appropriate: allergies, current medications, past family history, past medical history, past social history, past surgical history, and problem list.    Review of Systems   Constitutional:  Negative for diaphoresis and fatigue.   Respiratory:  Negative for cough and shortness of breath.    Cardiovascular:  Negative for chest pain and leg swelling.   Neurological:  Negative for seizures.   Psychiatric/Behavioral:  Negative for behavioral problems.      /80 (BP Location: Left arm, Patient Position: Sitting, Cuff Size: Large Adult)   Pulse 90   Temp 96.5 °F (35.8 °C) (Temporal)   Ht 180 cm (70.87\")   Wt 128 kg (282 lb 3.2 oz)   SpO2 96%   BMI 39.51 kg/m²     Objective   Physical Exam  Vitals reviewed.   Cardiovascular:      Rate and Rhythm: Normal rate and regular rhythm.   Pulmonary:      Effort: No respiratory distress.      Breath sounds: No wheezing.   Musculoskeletal:      Comments: Acquired absence of left leg above the knee   Neurological:      Mental Status: He is alert.       Assessment & Plan   Diagnoses " and all orders for this visit:    1. 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 Larry previously satisfied and continues to satisfy all prerequisite inclusion criteria for advanced MPK us including:  Patient has potential to ambulate at K3?4 independently in home and community  Patient requires use of microprocessor knee unit to descend stairs in home and uneven terrain in community  Patient can ambulate 400 ft with prosthesis multiple times a day  Patient will walk at variable speeds daily  Patient has cognitive ability to utilize microprocessor  Patient has adequate cardiac reserve to successfully use a microprocessor knee  Patient is indicated for Left Transfemoral Prosthetic Component and necessary supplies to maintain independent function and continued ability to work

## 2025-07-18 ENCOUNTER — OFFICE VISIT (OUTPATIENT)
Dept: INTERNAL MEDICINE | Facility: CLINIC | Age: 40
End: 2025-07-18
Payer: COMMERCIAL

## 2025-07-18 VITALS
WEIGHT: 290.4 LBS | DIASTOLIC BLOOD PRESSURE: 72 MMHG | TEMPERATURE: 97.7 F | OXYGEN SATURATION: 99 % | HEIGHT: 71 IN | HEART RATE: 70 BPM | BODY MASS INDEX: 40.65 KG/M2 | SYSTOLIC BLOOD PRESSURE: 120 MMHG

## 2025-07-18 DIAGNOSIS — Z89.612 ACQUIRED ABSENCE OF LEFT LEG ABOVE KNEE: Primary | ICD-10-CM

## 2025-07-18 RX ORDER — KETOCONAZOLE 20 MG/ML
SHAMPOO, SUSPENSION TOPICAL
COMMUNITY
Start: 2025-06-22

## 2025-07-18 NOTE — PROGRESS NOTES
Subjective   Enrique Larry is a 40 y.o. male.   Chief Complaint   Patient presents with    Med Management     Needs information charted from my chart message and a new script for a new leg       History of Present Illness   Here for new script for his prosthetic leg. No CP or SOA.  Frequent falls.      His reports today dire circumstances as his existing Ottobock X3 MPK is malfunctioning with loose hydraulics.  He reports a few recent non-injurious falls where the knee gave way on uneven terrain.  He is reluctant to downgrade technology when he is a candidate for the X4 MPK, but understands the urgency of having a functioning knee.  He recently completed a trial with the Ossur Navii MPK and is currently indicated for this device given his history with MPK technology as well as comfort and stability he observed with this knee .  He continues to demonstrate K4 ambulatory ability and requires componentry that will keep him safe during ambulation on level on and uneven terrain.  He is also indicated for replacement gel liners, and his activity level warrants a more frequent replacement of liner supplies (recommended 3x/year and/or every 4 mos PRN).  This will promote healthy skin integrity given his dermatological history with boils and treatment in medial adductor region.         Enrique is requesting additional documentation to support medical necessity for his prosthesis.  He reports a lengthy claims process that included approval of the Genium X4 regarding his candidacy and medical necessity.  His vocational responsibilities as a  warrant an MPK above baseline activity.  Additionally, his recreational activities and ADLs  justify prescription recommendation.  However, he reports his employer was unwilling to approve the upgrade costs required to complete the single case agreement through his employer's self funded health plan and subsequent denial has been upheld.  Previous documentation is  "now outdated and new medical record is needed to move forward with standard MPK and liner replacement claim.        Problem List  The following portions of the patient's history were reviewed and updated as appropriate: allergies, current medications, past family history, past medical history, past social history, past surgical history, and problem list.    Review of Systems   Constitutional:  Negative for chills, diaphoresis, fatigue and fever.   HENT:  Negative for congestion and sore throat.    Respiratory:  Negative for cough.    Cardiovascular:  Negative for chest pain.   Gastrointestinal:  Negative for abdominal pain, nausea and vomiting.   Genitourinary:  Negative for dysuria.   Musculoskeletal:  Negative for myalgias and neck pain.   Skin:  Negative for rash.   Neurological:  Negative for weakness, numbness and headaches.     Pulse 70   Temp 97.7 °F (36.5 °C) (Temporal)   Ht 180 cm (70.87\")   Wt 132 kg (290 lb 6.4 oz)   SpO2 99%   BMI 40.66 kg/m²     Objective   Physical Exam  Vitals reviewed.   Cardiovascular:      Rate and Rhythm: Normal rate and regular rhythm.   Pulmonary:      Effort: No respiratory distress.      Breath sounds: No wheezing.   Musculoskeletal:      Comments: Left leg prosthetic   Neurological:      Mental Status: He is alert and oriented to person, place, and time.         Assessment & Plan   Diagnoses and all orders for this visit:    1. 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 See above reason in HPI.               "